# Patient Record
Sex: FEMALE | Race: WHITE
[De-identification: names, ages, dates, MRNs, and addresses within clinical notes are randomized per-mention and may not be internally consistent; named-entity substitution may affect disease eponyms.]

---

## 2017-04-08 ENCOUNTER — HOSPITAL ENCOUNTER (EMERGENCY)
Dept: HOSPITAL 83 - ED | Age: 62
Discharge: HOME | End: 2017-04-08
Payer: COMMERCIAL

## 2017-04-08 VITALS — BODY MASS INDEX: 31.39 KG/M2 | WEIGHT: 200 LBS | HEIGHT: 66.97 IN

## 2017-04-08 DIAGNOSIS — Z88.6: ICD-10-CM

## 2017-04-08 DIAGNOSIS — K57.92: Primary | ICD-10-CM

## 2017-04-08 LAB
ALBUMIN SERPL-MCNC: 3.6 GM/DL (ref 3.1–4.5)
ALBUMIN SERPL-MCNC: NEGATIVE G/DL
ALP SERPL-CCNC: 84 U/L (ref 45–117)
ALT SERPL W P-5'-P-CCNC: 28 U/L (ref 12–78)
APPEARANCE UR: CLEAR
AST SERPL-CCNC: 17 IU/L (ref 3–35)
BACTERIA #/AREA URNS HPF: (no result) /[HPF]
BASOPHILS # BLD AUTO: 0 10*3/UL (ref 0–0.1)
BASOPHILS NFR BLD AUTO: 0.2 % (ref 0–1)
BILIRUB UR QL STRIP: NEGATIVE
BUN SERPL-MCNC: 14 MG/DL (ref 7–24)
CHLORIDE SERPL-SCNC: 108 MMOL/L (ref 98–107)
CO2 SERPL-SCNC: 21 MMOL/L (ref 21–32)
COLOR UR: YELLOW
CRP SERPL-MCNC: 9.27 MG/DL (ref 0–0.3)
EOSINOPHIL # BLD AUTO: 0.2 10*3/UL (ref 0–0.4)
EOSINOPHIL # BLD AUTO: 1.4 % (ref 1–4)
ERYTHROCYTE [DISTWIDTH] IN BLOOD BY AUTOMATED COUNT: 13.3 % (ref 0–14.5)
GLUCOSE SERPL-MCNC: 121 MG/DL (ref 65–99)
GLUCOSE UR QL: NEGATIVE
HCT VFR BLD AUTO: 42.7 % (ref 37–47)
HGB BLD-MCNC: 13.7 G/DL (ref 12–16)
HGB UR QL STRIP: (no result)
IG #: 0.1 10*3/UL (ref 0–0.1)
KETONES UR QL STRIP: NEGATIVE
LEUKOCYTE ESTERASE UR QL STRIP: NEGATIVE
LYMPHOCYTES # BLD AUTO: 2.7 10*3/UL (ref 1.3–4.4)
LYMPHOCYTES NFR BLD AUTO: 20 % (ref 27–41)
MCH RBC QN AUTO: 28.2 PG (ref 27–31)
MCHC RBC AUTO-ENTMCNC: 32.1 G/DL (ref 33–37)
MCV RBC AUTO: 88 FL (ref 81–99)
MONOCYTES # BLD AUTO: 1.1 10*3/UL (ref 0.1–1)
MONOCYTES NFR BLD MANUAL: 8.4 % (ref 3–9)
NEUT #: 9.3 10*3/UL (ref 2.3–7.9)
NEUT %: 69.6 % (ref 47–73)
NITRITE UR QL STRIP: NEGATIVE
NRBC BLD QL AUTO: 0 % (ref 0–0)
PH UR STRIP: 5 [PH] (ref 5–9)
PLATELET # BLD AUTO: 209 10*3/UL (ref 130–400)
PMV BLD AUTO: 10.5 FL (ref 9.6–12.3)
POTASSIUM SERPL-SCNC: 4 MMOL/L (ref 3.5–5.1)
PROT SERPL-MCNC: 8.4 GM/DL (ref 6.4–8.2)
RBC # BLD AUTO: 4.85 10*6/UL (ref 4.1–5.1)
RBC #/AREA URNS HPF: (no result) RBC/HPF (ref 0–2)
SODIUM SERPL-SCNC: 140 MMOL/L (ref 136–145)
SP GR UR: 1.02 (ref 1–1.03)
URINE REFLEX COMMENT: YES
UROBILINOGEN UR STRIP-MCNC: 0.2 E.U./DL (ref 0.2–1)
WBC #/AREA URNS HPF: (no result) WBC/HPF (ref 0–5)
WBC NRBC COR # BLD AUTO: 13.3 10*3/UL (ref 4.8–10.8)

## 2017-04-09 ENCOUNTER — HOSPITAL ENCOUNTER (INPATIENT)
Dept: HOSPITAL 83 - ED | Age: 62
LOS: 1 days | Discharge: HOME | DRG: 392 | End: 2017-04-10
Attending: INTERNAL MEDICINE | Admitting: INTERNAL MEDICINE
Payer: COMMERCIAL

## 2017-04-09 VITALS — DIASTOLIC BLOOD PRESSURE: 65 MMHG | SYSTOLIC BLOOD PRESSURE: 109 MMHG

## 2017-04-09 VITALS — DIASTOLIC BLOOD PRESSURE: 83 MMHG

## 2017-04-09 VITALS — SYSTOLIC BLOOD PRESSURE: 136 MMHG | DIASTOLIC BLOOD PRESSURE: 87 MMHG

## 2017-04-09 VITALS — SYSTOLIC BLOOD PRESSURE: 135 MMHG | DIASTOLIC BLOOD PRESSURE: 85 MMHG

## 2017-04-09 VITALS — DIASTOLIC BLOOD PRESSURE: 99 MMHG | SYSTOLIC BLOOD PRESSURE: 153 MMHG

## 2017-04-09 DIAGNOSIS — K57.92: Primary | ICD-10-CM

## 2017-04-09 DIAGNOSIS — F41.9: ICD-10-CM

## 2017-04-09 DIAGNOSIS — Z79.899: ICD-10-CM

## 2017-04-09 DIAGNOSIS — D72.829: ICD-10-CM

## 2017-04-09 DIAGNOSIS — F33.0: ICD-10-CM

## 2017-04-09 DIAGNOSIS — Z83.79: ICD-10-CM

## 2017-04-09 DIAGNOSIS — Z88.6: ICD-10-CM

## 2017-04-09 DIAGNOSIS — I10: ICD-10-CM

## 2017-04-09 DIAGNOSIS — Z79.2: ICD-10-CM

## 2017-04-09 DIAGNOSIS — Z90.722: ICD-10-CM

## 2017-04-09 LAB
ALBUMIN SERPL-MCNC: 3.8 GM/DL (ref 3.1–4.5)
ALP SERPL-CCNC: 81 U/L (ref 45–117)
ALT SERPL W P-5'-P-CCNC: 25 U/L (ref 12–78)
AST SERPL-CCNC: 13 IU/L (ref 3–35)
BASOPHILS # BLD AUTO: 0 10*3/UL (ref 0–0.1)
BASOPHILS NFR BLD AUTO: 0.2 % (ref 0–1)
BUN SERPL-MCNC: 16 MG/DL (ref 7–24)
CHLORIDE SERPL-SCNC: 107 MMOL/L (ref 98–107)
CO2 SERPL-SCNC: 23 MMOL/L (ref 21–32)
CRP SERPL-MCNC: 10.9 MG/DL (ref 0–0.3)
EOSINOPHIL # BLD AUTO: 0.1 10*3/UL (ref 0–0.4)
EOSINOPHIL # BLD AUTO: 0.9 % (ref 1–4)
ERYTHROCYTE [DISTWIDTH] IN BLOOD BY AUTOMATED COUNT: 13.2 % (ref 0–14.5)
GLUCOSE SERPL-MCNC: 119 MG/DL (ref 65–99)
HCT VFR BLD AUTO: 42.1 % (ref 37–47)
HGB BLD-MCNC: 13.6 G/DL (ref 12–16)
IG #: 0.1 10*3/UL (ref 0–0.1)
LYMPHOCYTES # BLD AUTO: 2.7 10*3/UL (ref 1.3–4.4)
LYMPHOCYTES NFR BLD AUTO: 19.2 % (ref 27–41)
MCH RBC QN AUTO: 28.3 PG (ref 27–31)
MCHC RBC AUTO-ENTMCNC: 32.3 G/DL (ref 33–37)
MCV RBC AUTO: 87.5 FL (ref 81–99)
MONOCYTES # BLD AUTO: 1.4 10*3/UL (ref 0.1–1)
MONOCYTES NFR BLD MANUAL: 9.9 % (ref 3–9)
NEUT #: 9.6 10*3/UL (ref 2.3–7.9)
NEUT %: 69.3 % (ref 47–73)
NRBC BLD QL AUTO: 0 10*3/UL (ref 0–0)
PLATELET # BLD AUTO: 206 10*3/UL (ref 130–400)
PMV BLD AUTO: 10.4 FL (ref 9.6–12.3)
POTASSIUM SERPL-SCNC: 4 MMOL/L (ref 3.5–5.1)
PROT SERPL-MCNC: 7.7 GM/DL (ref 6.4–8.2)
RBC # BLD AUTO: 4.81 10*6/UL (ref 4.1–5.1)
SODIUM SERPL-SCNC: 141 MMOL/L (ref 136–145)
WBC NRBC COR # BLD AUTO: 13.9 10*3/UL (ref 4.8–10.8)

## 2017-04-10 VITALS — DIASTOLIC BLOOD PRESSURE: 62 MMHG | SYSTOLIC BLOOD PRESSURE: 116 MMHG

## 2017-04-10 VITALS — DIASTOLIC BLOOD PRESSURE: 80 MMHG | SYSTOLIC BLOOD PRESSURE: 109 MMHG

## 2017-04-10 VITALS — DIASTOLIC BLOOD PRESSURE: 72 MMHG

## 2017-04-10 VITALS — DIASTOLIC BLOOD PRESSURE: 73 MMHG

## 2017-06-26 ENCOUNTER — HOSPITAL ENCOUNTER (EMERGENCY)
Dept: HOSPITAL 83 - ED | Age: 62
Discharge: HOME | End: 2017-06-26
Payer: COMMERCIAL

## 2017-06-26 VITALS — BODY MASS INDEX: 31.39 KG/M2 | WEIGHT: 200 LBS | HEIGHT: 66.97 IN

## 2017-06-26 DIAGNOSIS — M54.5: ICD-10-CM

## 2017-06-26 DIAGNOSIS — G89.29: Primary | ICD-10-CM

## 2017-06-26 DIAGNOSIS — M19.90: ICD-10-CM

## 2017-06-26 DIAGNOSIS — Z88.6: ICD-10-CM

## 2017-07-13 ENCOUNTER — HOSPITAL ENCOUNTER (OUTPATIENT)
Dept: HOSPITAL 83 - MAMMO | Age: 62
Discharge: HOME | End: 2017-07-13
Attending: OBSTETRICS & GYNECOLOGY
Payer: COMMERCIAL

## 2017-07-13 DIAGNOSIS — Z12.31: Primary | ICD-10-CM

## 2017-09-21 ENCOUNTER — HOSPITAL ENCOUNTER (EMERGENCY)
Dept: HOSPITAL 83 - ED | Age: 62
Discharge: HOME | End: 2017-09-21
Payer: COMMERCIAL

## 2017-09-21 VITALS — BODY MASS INDEX: 29.82 KG/M2 | WEIGHT: 190 LBS | HEIGHT: 66.97 IN

## 2017-09-21 DIAGNOSIS — M19.90: ICD-10-CM

## 2017-09-21 DIAGNOSIS — M79.1: Primary | ICD-10-CM

## 2017-09-21 DIAGNOSIS — Z88.6: ICD-10-CM

## 2017-09-21 DIAGNOSIS — Z79.899: ICD-10-CM

## 2017-10-14 ENCOUNTER — HOSPITAL ENCOUNTER (INPATIENT)
Dept: HOSPITAL 83 - ED | Age: 62
LOS: 2 days | Discharge: HOME | DRG: 392 | End: 2017-10-16
Attending: INTERNAL MEDICINE | Admitting: INTERNAL MEDICINE
Payer: COMMERCIAL

## 2017-10-14 VITALS — DIASTOLIC BLOOD PRESSURE: 73 MMHG | SYSTOLIC BLOOD PRESSURE: 128 MMHG

## 2017-10-14 VITALS — BODY MASS INDEX: 30.53 KG/M2 | WEIGHT: 194.5 LBS | HEIGHT: 66.97 IN

## 2017-10-14 VITALS — DIASTOLIC BLOOD PRESSURE: 56 MMHG | SYSTOLIC BLOOD PRESSURE: 109 MMHG

## 2017-10-14 VITALS — SYSTOLIC BLOOD PRESSURE: 114 MMHG | DIASTOLIC BLOOD PRESSURE: 78 MMHG

## 2017-10-14 VITALS — DIASTOLIC BLOOD PRESSURE: 72 MMHG

## 2017-10-14 DIAGNOSIS — F41.1: ICD-10-CM

## 2017-10-14 DIAGNOSIS — K75.81: ICD-10-CM

## 2017-10-14 DIAGNOSIS — F33.9: ICD-10-CM

## 2017-10-14 DIAGNOSIS — K44.9: ICD-10-CM

## 2017-10-14 DIAGNOSIS — K57.92: Primary | ICD-10-CM

## 2017-10-14 LAB
ALBUMIN SERPL-MCNC: 3.9 GM/DL (ref 3.1–4.5)
ALP SERPL-CCNC: 94 U/L (ref 45–117)
ALT SERPL W P-5'-P-CCNC: 31 U/L (ref 12–78)
APPEARANCE UR: (no result)
AST SERPL-CCNC: 18 IU/L (ref 3–35)
BACTERIA #/AREA URNS HPF: (no result) /[HPF]
BASOPHILS # BLD AUTO: 0 10*3/UL (ref 0–0.1)
BASOPHILS NFR BLD AUTO: 0.2 % (ref 0–1)
BILIRUB UR QL STRIP: NEGATIVE
BUN SERPL-MCNC: 14 MG/DL (ref 7–24)
CHLORIDE SERPL-SCNC: 107 MMOL/L (ref 98–107)
COLOR UR: YELLOW
CREAT SERPL-MCNC: 0.88 MG/DL (ref 0.55–1.02)
EOSINOPHIL # BLD AUTO: 0.1 10*3/UL (ref 0–0.4)
EOSINOPHIL # BLD AUTO: 0.9 % (ref 1–4)
EPI CELLS #/AREA URNS HPF: (no result) /[HPF]
ERYTHROCYTE [DISTWIDTH] IN BLOOD BY AUTOMATED COUNT: 16 % (ref 0–14.5)
GLUCOSE UR QL: NEGATIVE
HCT VFR BLD AUTO: 37.6 % (ref 37–47)
HGB BLD-MCNC: 11.8 G/DL (ref 12–16)
HGB UR QL STRIP: NEGATIVE
KETONES UR QL STRIP: (no result)
LEUKOCYTE ESTERASE UR QL STRIP: (no result)
LYMPHOCYTES # BLD AUTO: 1.8 10*3/UL (ref 1.3–4.4)
LYMPHOCYTES NFR BLD AUTO: 15.7 % (ref 27–41)
MCH RBC QN AUTO: 25.8 PG (ref 27–31)
MCHC RBC AUTO-ENTMCNC: 31.4 G/DL (ref 33–37)
MCV RBC AUTO: 82.3 FL (ref 81–99)
MONOCYTES # BLD AUTO: 1.1 10*3/UL (ref 0.1–1)
MONOCYTES NFR BLD MANUAL: 9 % (ref 3–9)
NEUT #: 8.7 10*3/UL (ref 2.3–7.9)
NEUT %: 73.9 % (ref 47–73)
NITRITE UR QL STRIP: NEGATIVE
NRBC BLD QL AUTO: 0 10*3/UL (ref 0–0)
PH UR STRIP: 5.5 [PH] (ref 5–9)
PLATELET # BLD AUTO: 192 10*3/UL (ref 130–400)
PMV BLD AUTO: 11.1 FL (ref 9.6–12.3)
POTASSIUM SERPL-SCNC: 4.5 MMOL/L (ref 3.5–5.1)
PROT SERPL-MCNC: 7.8 GM/DL (ref 6.4–8.2)
RBC # BLD AUTO: 4.57 10*6/UL (ref 4.1–5.1)
RBC #/AREA URNS HPF: (no result) RBC/HPF (ref 0–2)
SODIUM SERPL-SCNC: 140 MMOL/L (ref 136–145)
SP GR UR: 1.02 (ref 1–1.03)
UROBILINOGEN UR STRIP-MCNC: 0.2 E.U./DL (ref 0.2–1)
WBC #/AREA URNS HPF: (no result) WBC/HPF (ref 0–5)
WBC NRBC COR # BLD AUTO: 11.7 10*3/UL (ref 4.8–10.8)

## 2017-10-14 NOTE — NUR
RESTING QUIETLY IN BED WITH EYES CLOSED. C/O ABDOMINAL PAIN OF 6, REPORTS THIS
IS IMPROVED. NO FURTHER COMPLAINTS. NO SIGN OF DISTRESS. IVF COMPLETE.

## 2017-10-14 NOTE — NUR
PATIENT LYING IN BED, NO S/S OF DISTRESS. NO EDEMA NOTED. BOWEL SOUNDS
NORMOACTIVE, NO COUGH. PATIENT STATED THAT SHE HAD BM TODAY. IV INTACT IN LEFT
HAND, IV FLUID RUNNING WITHOUT PROBLEMS.

## 2017-10-14 NOTE — NUR
Time: 1800
A 62  year old FEMALE admitted to 5E
under services of MARIALUISA CHAUDHARY MD.
Pt. arrived via stretcher from
ER.  Chief complaint: ABDOMINAL PAIN.
 
BREEZY BHAGAT

## 2017-10-14 NOTE — WRIGHTHP
[place], [place]
 
                               PATIENT HISTORY AND PHYSICAL EXAM
 
        NAME: ILSA GRUBER               [facility] #: [id number]  
        UNIT #: [id number]                        ROOM: 504       
        DOCTOR: MARIALUISA MORALES MD                BIRTHDATE: [birth year]
 
 
DOS: 10/14/2017
 
HISTORY OF PRESENT ILLNESS:  The patient is 62 years old.  The patient is not
known to me.  The patient of  _____.  She was brought in with complaints of
abdominal pain.  The patient states that she was admitted to the hospital
earlier this year with acute diverticulitis.  She was not very careful and was
eating protein bar which had nuts in it, developed severe abdominal pain
yesterday, so came in to the Emergency Room for evaluation, was diagnosed with
acute diverticulitis and was admitted.  The patient denies having any chest
pains, palpitations, does not have any fever or chills, does not have any nausea
or any emesis.  States that she is feeling better already.
 
PAST MEDICAL HISTORY:  Significant for;
1.  Generalized anxiety disorder.
2.  Mild major depression, recurrent.
3.  Diverticulosis with multiple admissions for diverticulitis.
 
MEDICATIONS:  Lipitor 5 mg daily, vitamin D 5000 daily, diazepam 10 t.i.d.,
Prozac 20 b.i.d., gabapentin 100 b.i.d., levothyroxine 50 mcg, meloxicam 15
daily.
 
SOCIAL HISTORY:  Nonsmoker.
 
PHYSICAL EXAMINATION:
GENERAL:  She is awake and alert and oriented.  No major distress.
VITAL SIGNS:  Graphic trend shows a pressure of 80/60, pulse of 73, respirations
18, temperature 97.6.
LUNGS:  Clear.
HEART:  Regular.
ABDOMEN:  Obese, soft, nontender.
EXTREMITIES:  Without any edema.
 
ASSESSMENT AND PLAN:
1.  Acute diverticulitis without any surrounding inflammation.  The patient to
have a GI consultation with Dr. Zee.  IV antibiotics have been started.  We
will require a colonoscopy.
2.  Hiatal hernia with fatty liver, nonalcoholic steatohepatitis noted.  Advised
weight loss and continuation of the cholesterol-lowering medications.
 
 
 
 
                              [place], [place]
 
                               PATIENT HISTORY AND PHYSICAL EXAM
 
        [facility]: ILSA GRUBER               [id number]  
        UNIT #: [id number]                        ROOM: 504       
        DOCTOR: MARIALUISA MORALES MD                BIRTHDATE: [birth year]
 
 
_________________________________
MARIALUISA MORALES MD
 
CM:MARGAS:PATIENT HISTORY AND PHYSICAL EXAMINATION
 
D: 10/15/17 0811
T: 10/15/17 0909
    
                                                            
MARIALUISA MORALES MD               
                                                            
10/15/17
0909
                              
interface

## 2017-10-14 NOTE — DS
[place], [place]
 
                                    DISCHARGE SUMMARY
 
        NAME: ILSA GRUBER               [facility] #: [id number]  
        UNIT #: [id number]                        ROOM: 504       
        DOCTOR: MARIALUISA MORALES MD                BIRTHDATE: [birth year]
 
 
DOS: 10/16/2017
 
DIAGNOSES:
1.  Acute diverticulitis, noncomplicated.
2.  Generalized anxiety disorder.
3.  Mild major depression, recurrent.
4.  History of colonoscopy 3 months ago.
5.  Nonalcoholic steatohepatitis with hiatal hernia.
 
MEDICATIONS ON DISCHARGE:  Flagyl 500 mg 3 times a day for 7 days, Cipro 500 mg
twice a day for 5 days and then home medications are being continued.
 
HOSPITAL COURSE:  A 62-year-old presents with abdominal pain.  Please see H and
P for details.  She was diagnosed with acute diverticulitis and was admitted.  A
CT of the abdomen showed no evidence of any abscess formation.  Uncomplicated
diverticulitis was seen on a CT.  IV antibiotics were given and the patient is
doing much better, does not have any new complaints and the plan therefore is to
discharge her to home today on continued antibiotics for 1 more week and follow
up with her PCP after the completion of antibiotics.  She is aware of the fact
that she should avoid nuts, corn, popcorn and seeds.
 
 
 
_________________________________
MARIALUISA MORALES MD
 
CM:KALPESH
 
D: 10/16/17 0834
T: 10/16/17 0944
    
                                                            
MARIALUISA MORALES MD               
                                                            
10/16/17
0943
                              
interface

## 2017-10-14 NOTE — PR
[place], [place]
 
                                      PROGRESS NOTE
 
        NAME: ILSA GRUBER              [facility] #: [id number]  
        UNIT #: [id number]                       ROOM: 504       
        DOCTOR: MARIALUISA MORALES MD               BIRTHDATE: [birth year]
 
 
DOS: 
 
SUBJECTIVE:  The patient is doing fine without any complaints.
 
OBJECTIVE:
GENERAL:  The patient is awake and alert and oriented.
VITAL SIGNS:  Blood pressure is 114/74, pulse of 67, respirations 18,
temperature 97.6.
LUNGS:  Clear.
HEART:  Regular.
ABDOMEN:  Obese, soft, nontender this morning.
EXTREMITIES:  Without any edema.
 
ASSESSMENT AND PLAN:
1.  Acute noncomplicated diverticulitis.  The patient is clinically much
improved.  The plan is to discharge her to home today on p.o. antibiotics.  She
did have a colonoscopy 3 months ago, which was unremarkable, so she can follow
up with the GI doctor.
2.  Urine culture was negative.  Urinary tract infection has been ruled out.
3.  Generalized anxiety disorder, controlled.
 
 
 
_________________________________
MARIALUISA MORALES MD
 
CM:PNTRANS
 
D: 10/16/17 0832                 
T: 10/16/17 1018
             
                                                            
MARIALUISA MORALES MD               
                                                            
10/16/17
1016
                              
interface

## 2017-10-15 VITALS — DIASTOLIC BLOOD PRESSURE: 55 MMHG

## 2017-10-15 VITALS — DIASTOLIC BLOOD PRESSURE: 78 MMHG | SYSTOLIC BLOOD PRESSURE: 80 MMHG

## 2017-10-15 VITALS — SYSTOLIC BLOOD PRESSURE: 99 MMHG | DIASTOLIC BLOOD PRESSURE: 68 MMHG

## 2017-10-15 NOTE — NUR
PATIENT UP TO BATHROOM SEVERAL TIMES DURING THE NIGHT, AMBULATORY WITHOUT
PROBLEMS. IV INTACT, NO PROBLEMS NOTED.

## 2017-10-16 VITALS — DIASTOLIC BLOOD PRESSURE: 70 MMHG | SYSTOLIC BLOOD PRESSURE: 126 MMHG

## 2017-10-16 VITALS — DIASTOLIC BLOOD PRESSURE: 59 MMHG | SYSTOLIC BLOOD PRESSURE: 97 MMHG

## 2017-10-16 VITALS — DIASTOLIC BLOOD PRESSURE: 74 MMHG | SYSTOLIC BLOOD PRESSURE: 114 MMHG

## 2017-10-16 NOTE — NUR
Patient resting quietly with no c/o discomfort. Respirations easy and regular.
Vital signs stable. No overt distress.
JESUS BROWNE

## 2017-10-16 NOTE — NUR
Discharge instructions reviewed with patient/family. Patient receptive and
verbalizes understanding. Follow-up care arranged. Written instructions given
to patient/family.HEPLOCK REMOVED.
JESUS BROWNE

## 2018-04-15 ENCOUNTER — HOSPITAL ENCOUNTER (EMERGENCY)
Dept: HOSPITAL 83 - ED | Age: 63
Discharge: HOME | End: 2018-04-15
Payer: MEDICARE

## 2018-04-15 VITALS — WEIGHT: 180 LBS | BODY MASS INDEX: 28.25 KG/M2 | HEIGHT: 66.97 IN
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
## 2018-04-15 DIAGNOSIS — S92.251A: Primary | ICD-10-CM

## 2018-04-15 DIAGNOSIS — Z88.5: ICD-10-CM

## 2018-04-15 DIAGNOSIS — M54.5: ICD-10-CM

## 2018-04-15 DIAGNOSIS — M19.90: ICD-10-CM
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
## 2018-04-15 DIAGNOSIS — Z79.899: ICD-10-CM

## 2018-04-15 DIAGNOSIS — G89.29: ICD-10-CM

## 2018-04-15 DIAGNOSIS — Z98.890: ICD-10-CM
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
## 2018-06-12 ENCOUNTER — HOSPITAL ENCOUNTER (EMERGENCY)
Dept: HOSPITAL 83 - ED | Age: 63
Discharge: HOME | End: 2018-06-12
Payer: MEDICARE

## 2018-06-12 VITALS — BODY MASS INDEX: 26.84 KG/M2 | WEIGHT: 171 LBS | HEIGHT: 66.97 IN

## 2018-06-12 DIAGNOSIS — Z79.899: ICD-10-CM

## 2018-06-12 DIAGNOSIS — Z88.5: ICD-10-CM

## 2018-06-12 DIAGNOSIS — Z88.1: ICD-10-CM

## 2018-06-12 DIAGNOSIS — N39.0: Primary | ICD-10-CM

## 2018-06-12 DIAGNOSIS — M19.90: ICD-10-CM

## 2018-06-12 DIAGNOSIS — G89.29: ICD-10-CM

## 2018-06-12 LAB
APPEARANCE UR: CLEAR
BILIRUB UR QL STRIP: NEGATIVE
COLOR UR: YELLOW
EPI CELLS #/AREA URNS HPF: (no result) /[HPF]
GLUCOSE UR QL: NEGATIVE
HGB UR QL STRIP: NEGATIVE
KETONES UR QL STRIP: NEGATIVE
LEUKOCYTE ESTERASE UR QL STRIP: (no result)
NITRITE UR QL STRIP: NEGATIVE
PH UR STRIP: 5.5 [PH] (ref 5–9)
RBC #/AREA URNS HPF: (no result) RBC/HPF (ref 0–2)
SP GR UR: >= 1.03 (ref 1–1.03)
UROBILINOGEN UR STRIP-MCNC: 0.2 E.U./DL (ref 0.2–1)
WBC #/AREA URNS HPF: (no result) WBC/HPF (ref 0–5)

## 2019-02-28 ENCOUNTER — HOSPITAL ENCOUNTER (EMERGENCY)
Dept: HOSPITAL 83 - ED | Age: 64
Discharge: HOME | End: 2019-02-28
Payer: MEDICARE

## 2019-02-28 VITALS — BODY MASS INDEX: 25.11 KG/M2 | WEIGHT: 160 LBS | HEIGHT: 66.97 IN

## 2019-02-28 DIAGNOSIS — Z88.1: ICD-10-CM

## 2019-02-28 DIAGNOSIS — Z90.89: ICD-10-CM
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
## 2019-02-28 DIAGNOSIS — S50.11XA: Primary | ICD-10-CM

## 2019-02-28 DIAGNOSIS — S60.211A: ICD-10-CM

## 2019-02-28 DIAGNOSIS — Z88.5: ICD-10-CM
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
## 2019-02-28 DIAGNOSIS — Z46.89: ICD-10-CM

## 2019-02-28 DIAGNOSIS — Z79.899: ICD-10-CM
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
## 2019-03-09 ENCOUNTER — HOSPITAL ENCOUNTER (EMERGENCY)
Dept: HOSPITAL 83 - ED | Age: 64
Discharge: HOME | End: 2019-03-09
Payer: MEDICARE

## 2019-03-09 VITALS — BODY MASS INDEX: 26.68 KG/M2 | HEIGHT: 66.97 IN | WEIGHT: 170 LBS

## 2019-03-09 DIAGNOSIS — Z88.1: ICD-10-CM

## 2019-03-09 DIAGNOSIS — Z79.899: ICD-10-CM

## 2019-03-09 DIAGNOSIS — Z98.890: ICD-10-CM

## 2019-03-09 DIAGNOSIS — Z88.6: ICD-10-CM

## 2019-03-09 DIAGNOSIS — Z48.01: ICD-10-CM

## 2019-03-09 DIAGNOSIS — M79.601: Primary | ICD-10-CM

## 2020-02-25 ENCOUNTER — HOSPITAL ENCOUNTER (EMERGENCY)
Dept: HOSPITAL 83 - ED | Age: 65
LOS: 1 days | Discharge: HOME HEALTH | End: 2020-02-26
Payer: COMMERCIAL

## 2020-02-25 VITALS — WEIGHT: 170 LBS | HEIGHT: 65.98 IN | BODY MASS INDEX: 27.32 KG/M2

## 2020-02-25 DIAGNOSIS — F22: ICD-10-CM

## 2020-02-25 DIAGNOSIS — M19.90: ICD-10-CM

## 2020-02-25 DIAGNOSIS — E78.5: ICD-10-CM

## 2020-02-25 DIAGNOSIS — Z88.5: ICD-10-CM

## 2020-02-25 DIAGNOSIS — Z79.899: ICD-10-CM

## 2020-02-25 DIAGNOSIS — G89.29: ICD-10-CM

## 2020-02-25 DIAGNOSIS — E03.9: ICD-10-CM

## 2020-02-25 DIAGNOSIS — Z88.1: ICD-10-CM

## 2020-02-25 DIAGNOSIS — F31.0: Primary | ICD-10-CM

## 2020-02-25 LAB
AMPHETAMINES UR QL SCN: < 1000
APAP SERPL-MCNC: < 5 UG/ML (ref 10–30)
APPEARANCE UR: CLEAR
BACTERIA #/AREA URNS HPF: (no result) /[HPF]
BARBITURATES UR QL SCN: < 200
BASOPHILS # BLD AUTO: 0 10*3/UL (ref 0–0.1)
BASOPHILS NFR BLD AUTO: 0.2 % (ref 0–1)
BENZODIAZ UR QL SCN: > 200
BILIRUB UR QL STRIP: NEGATIVE
BUN SERPL-MCNC: 13 MG/DL (ref 7–24)
BZE UR QL SCN: < 300
CANNABINOIDS UR QL SCN: < 50
CHLORIDE SERPL-SCNC: 108 MMOL/L (ref 98–107)
COLOR UR: YELLOW
CREAT SERPL-MCNC: 0.92 MG/DL (ref 0.55–1.02)
EOSINOPHIL # BLD AUTO: 0 10*3/UL (ref 0–0.4)
EOSINOPHIL # BLD AUTO: 0.3 % (ref 1–4)
ERYTHROCYTE [DISTWIDTH] IN BLOOD BY AUTOMATED COUNT: 16.5 % (ref 0–14.5)
ETHANOL SERPL-MCNC: < 3 MG/DL (ref ?–3)
GLUCOSE UR QL: NEGATIVE
HCT VFR BLD AUTO: 42.7 % (ref 37–47)
HGB BLD-MCNC: 13.2 G/DL (ref 12–16)
HGB UR QL STRIP: (no result)
KETONES UR QL STRIP: (no result)
LEUKOCYTE ESTERASE UR QL STRIP: (no result)
LYMPHOCYTES # BLD AUTO: 2 10*3/UL (ref 1.3–4.4)
LYMPHOCYTES NFR BLD AUTO: 22 % (ref 27–41)
MCH RBC QN AUTO: 26.6 PG (ref 27–31)
MCHC RBC AUTO-ENTMCNC: 30.9 G/DL (ref 33–37)
MCV RBC AUTO: 85.9 FL (ref 81–99)
METHADONE UR QL SCN: < 300
MONOCYTES # BLD AUTO: 0.8 10*3/UL (ref 0.1–1)
MONOCYTES NFR BLD MANUAL: 8.9 % (ref 3–9)
NEUT #: 6.2 10*3/UL (ref 2.3–7.9)
NEUT %: 68.4 % (ref 47–73)
NITRITE UR QL STRIP: NEGATIVE
NRBC BLD QL AUTO: 0 % (ref 0–0)
OPIATES UR QL SCN: < 300
PCP UR QL SCN: <  25
PH UR STRIP: 6 [PH] (ref 5–9)
PLATELET # BLD AUTO: 207 10*3/UL (ref 130–400)
PMV BLD AUTO: 11 FL (ref 9.6–12.3)
POTASSIUM SERPL-SCNC: 3.5 MMOL/L (ref 3.5–5.1)
RBC # BLD AUTO: 4.97 10*6/UL (ref 4.1–5.1)
SODIUM SERPL-SCNC: 141 MMOL/L (ref 136–145)
SP GR UR: 1.02 (ref 1–1.03)
UROBILINOGEN UR STRIP-MCNC: 0.2 E.U./DL (ref 0.2–1)
WBC #/AREA URNS HPF: (no result) WBC/HPF (ref 0–5)
WBC NRBC COR # BLD AUTO: 9 10*3/UL (ref 4.8–10.8)

## 2020-02-26 ENCOUNTER — HOSPITAL ENCOUNTER (INPATIENT)
Dept: HOSPITAL 83 - 3N | Age: 65
LOS: 16 days | DRG: 885 | End: 2020-03-13
Attending: PSYCHIATRY & NEUROLOGY | Admitting: PSYCHIATRY & NEUROLOGY
Payer: MEDICARE

## 2020-02-26 VITALS — SYSTOLIC BLOOD PRESSURE: 112 MMHG | DIASTOLIC BLOOD PRESSURE: 68 MMHG

## 2020-02-26 VITALS — SYSTOLIC BLOOD PRESSURE: 115 MMHG | DIASTOLIC BLOOD PRESSURE: 78 MMHG

## 2020-02-26 VITALS — BODY MASS INDEX: 23.23 KG/M2 | WEIGHT: 148 LBS | HEIGHT: 67 IN

## 2020-02-26 VITALS — DIASTOLIC BLOOD PRESSURE: 79 MMHG

## 2020-02-26 VITALS — DIASTOLIC BLOOD PRESSURE: 79 MMHG | SYSTOLIC BLOOD PRESSURE: 138 MMHG

## 2020-02-26 DIAGNOSIS — M54.5: ICD-10-CM

## 2020-02-26 DIAGNOSIS — M79.18: ICD-10-CM

## 2020-02-26 DIAGNOSIS — F13.20: ICD-10-CM

## 2020-02-26 DIAGNOSIS — G30.9: ICD-10-CM

## 2020-02-26 DIAGNOSIS — M19.90: ICD-10-CM

## 2020-02-26 DIAGNOSIS — R41.9: ICD-10-CM

## 2020-02-26 DIAGNOSIS — Z79.899: ICD-10-CM

## 2020-02-26 DIAGNOSIS — E78.5: ICD-10-CM

## 2020-02-26 DIAGNOSIS — R73.9: ICD-10-CM

## 2020-02-26 DIAGNOSIS — G89.29: ICD-10-CM

## 2020-02-26 DIAGNOSIS — E03.9: ICD-10-CM

## 2020-02-26 DIAGNOSIS — Z88.1: ICD-10-CM

## 2020-02-26 DIAGNOSIS — N80.9: ICD-10-CM

## 2020-02-26 DIAGNOSIS — Z90.722: ICD-10-CM

## 2020-02-26 DIAGNOSIS — F33.3: Primary | ICD-10-CM

## 2020-02-26 DIAGNOSIS — F02.80: ICD-10-CM

## 2020-02-26 DIAGNOSIS — F41.9: ICD-10-CM

## 2020-02-26 DIAGNOSIS — Z88.5: ICD-10-CM

## 2020-02-26 DIAGNOSIS — E87.8: ICD-10-CM

## 2020-02-26 DIAGNOSIS — Z83.3: ICD-10-CM

## 2020-02-26 DIAGNOSIS — E55.9: ICD-10-CM

## 2020-02-26 NOTE — NUR
POOR SLEEP. WANTS TO GO HOME.  DOESN'T WANT TO TAKE DEPAKOTE WANTS HER VALIUM.
REDIRECTED TO TIME AND REINFORCED DR ELMORE WOULD BE HERE IN MORNING. EMOTIONAL
SUPPORT PROVIDED

## 2020-02-26 NOTE — NUR
ILSA GRUBER a 65 year old F admitted via
wheel chair from the EMERGENCY ROOM as a emergency 72 hr. hold admission.
Arrived on unit at 0025AM.
ALLERGIES: CODIENE AND CIPRO.
Vital signs are: 97.9-87-16 138/79.
CLIENT IS PINK SLIPPED SO UNABLE TO SIGN VOLUNTARY CONSENT
The client signed the following forms with stated understanding: Authorization
For The Release of Medical Information,
, Consent and Release Forms/Receipt of Rights,
Acknowledgement of Advance Directive Information, Behavioral Health Consent
Form, and Informed Consent of Medications. [facility] A search was conducted and hazardous articles were removed.
Client was oriented to the unit.
ANGEL PAEZ

## 2020-02-26 NOTE — NUR
AM GROUP
COMPLETED PT ASSESSMENT IN PT ROOM AS PT LAY IN BED. PT EXPRESSED DELUSIONAL
THINKING DURING THE INTERVIEW. PT CHOSE NOT TO ATTEND MORNING GROUP BUT STAYED
IN BED.

## 2020-02-27 VITALS — SYSTOLIC BLOOD PRESSURE: 118 MMHG | DIASTOLIC BLOOD PRESSURE: 57 MMHG

## 2020-02-27 VITALS — SYSTOLIC BLOOD PRESSURE: 119 MMHG | DIASTOLIC BLOOD PRESSURE: 72 MMHG

## 2020-02-27 NOTE — NUR
TREATMENT PLAN MEETING WAS HELD WITH DR. ELMORE, RN, AT, LIS-S AND DISCHARGE
PLANNER. PLAN FOR DISCHARGE MONDAY WITH RETURN HOME.

## 2020-02-27 NOTE — NUR
P-DEPRESSED MOOD, MEDICATION NONCOMPLIANCE
I-PROVIDED 1:1 WITH THERAPEUTIC INTERVENTIONS. PROVIDED SUPPORT. ENCOURAGE
MEDICATION COMPLIANCE AND EDUCATED. MONITOR SLEEP.
R-PT ALERT AND ORIENTED X4. PT ISOLATIVE TO ROOM AND BED SINCE BEGINNING OF
SHIFT, CALM, REFUSED HS SNACK.  PT GUARDED DURING 1:1, STATING "IM NOT TAKING
ANY MEDICATIONS TONIGHT, I WANT A DIFFERENT DOCTOR AND IM NOT WANTING TO TALK
ABOUT IT", SUPPORT OFFERED BUT REFUSED. PT REFUSED ALL HS MEDICATIONS AND
EDUCATION. DENIES SI/HI, HALLUCINATIONS, OR PAIN. PT CONTRACTED FOR SAFETY. PT
INDEPENDENT IN ADL'S, CONTINENT OF BOWEL AND BLADDER. PT CURRENTLY RESTING
WITH EYES CLOSED, RESPIRATIONS EASY AND REGULAR, NO SIGNS OR SYMPTOMS OF
DISTRESS NOTED.
P-CONTINUE TO MONITOR MOOD AND BEHAVIORS. MAINTAIN Q 15 MIN CHECKS.

## 2020-02-27 NOTE — NUR
P-DEPRESSED MOOD
I-PROVIDED 1:1 WITH THERAPEUTIC INTERVENTIONS. PROVIDED SUPPORT. ENCOURAGE
MEDICATION COMPLIANCE AND EDUCATED. MONITOR SLEEP.
R-PT ALERT AND ORIENTED X4. PT ISOLATIVE TO ROOM AND BED SINCE BEGINNING OF
SHIFT, CALM. PT GUARDED DURING 1:1, STATING TO THIS RN THAT SHE HAS BEEN
DEPRESSED AND ANXIOUS FOR MANY REASONS OVER THE YEARS AND TO "READ UP ON HER
CHART". PT ALSO STATED THAT SHE IS ALSO CURRENTLY GRIEVING BECAUSE SHE IS
MISSING A FAMILY MEMBERS  THAT IS TAKING PLACE 20. SUPPORT
PROVIDED. PT DENIES SI/HI, HALLUCINATIONS, OR PAIN. PT CONTRACTED FOR SAFETY.
PT MEDICATION COMPLIANT WITHOUT DIFFICULTY AFTER REVIEW. PT INDEPENDENT IN
ADL'S, CONTINENT OF BOWEL AND BLADDER. PT CURRENTLY RESTING WITH EYES CLOSED,
RESPIRATIONS EASY AND REGULAR, NO SIGNS OR SYMPTOMS OF DISTRESS NOTED.
P-CONTINUE TO MONITOR MOOD AND BEHAVIORS. MAINTAIN Q 15 MIN CHECKS.

## 2020-02-27 NOTE — NUR
PATIENT OBSERVED ON Q 15 MIN CHECKS TO HAVE SLEPT APPROX 7 HOURS
UNINTERRUPTED. NO SIGNS OR SYMPTOMS OF DISTRESS NOTED.

## 2020-02-27 NOTE — NUR
Spoke with Kathrynmarleny Daniel of [facility] who
states that she has received calls voicing concern about pt. Calls have
been made by the police and neighbors. According to reports, pt has been
contacting neighbors at different times throughout the day complaining about
the neighbors' behaviors. One example is that pt called neighbor complaining
that they were playing their Lilly music too loud, but the neighbor's
house was actually empty because the neighbors were out of state at the time.
Kathryn has attempted to meet with pt in pt's home. When Kathryn stopped one
time, pt's  let Kathryn in but pt refused to come out of her bedroom to
speak to Kathryn. Kathryn was at pt's home today and met with pt's . He
informed Kathryn that pt is at [facility]. Because it has been difficult for Kathryn
to meet with pt in the home, Kathryn will be at [facility] tomorrow to meet with pt.
Kathryn did report that pt's  appeared to being doing well.

## 2020-02-28 VITALS — DIASTOLIC BLOOD PRESSURE: 61 MMHG | SYSTOLIC BLOOD PRESSURE: 122 MMHG

## 2020-02-28 VITALS — DIASTOLIC BLOOD PRESSURE: 80 MMHG

## 2020-02-28 NOTE — NUR
DOCTOR VILLALPANDO UPDATED WITH PT REFUSING MEALS ,FLUIDS,VITALS NEW ORDER TO KEEP
ENCOURAGING FLUIDS AND TO ATTEMPT VITALS EVERY SHIFT

## 2020-02-28 NOTE — NUR
PATIENT OBSERVED ON Q 15 MIN SAFETY CHECKS TO HAVE SLEPT APPROX 8 HOURS
THROUGHOUT THE NIGHT WITH NO AWAKENINGS OR SIGNS AND SYMPTOMS OF DISTRESS
NOTED.

## 2020-02-28 NOTE — NUR
TREATMENT PLAN MEETING WAS HELD WITH MISSY RUBIN, RN, AT, LISW-S AND DISCHARGE
PLANNER. PLAN FOR DISCHARGE NEXT WEEK, POSSIBLE WEDNESDAY WITH RETURN HOME.
APS WORKER GIULIA SONG TO SEE PATIENT TODAY FOR FOLLOW UP.

## 2020-02-28 NOTE — NUR
P-NONCOMPLIANT WITH CARE -PT REFUSE'S HOC,MEDS,MEALS ,FLUIDS,VITALS, GROUP
ATTENDANCE /PARTICIPATION, STAFF INTERACTION AND INTERACTIOON WITH OTHER PEERS
ISOLATED TO ROOM
I-1;1attempted,encourage medication compliance, encouraged to eat meals,
drink fluids, attend group participate and interact with staff and peers
r- pt still refusing hoc, medication's, meals, fluids,tells staff no WHEN
OFFERED FLUIDS OR SNACKS ALOMG WITH MEDICATIONS
P-ENCOURAGE 1;1, ENCOURAGE MEDICATION COMPLIANCE,OFFER MEALS WITH FLUIDS
,ENCOURAGE TO ATTEND AND PARTICIPATE IN GROUP ,INTERACT WITH STAFF AND PEERS
 
 
SEE [facility] [[email or web address]] FLOWSHEET FOE SPECIFIC MONITORING

## 2020-02-28 NOTE — NUR
GIULIA SONG IN TO SEE PATIENT FROM [facility] IN REGARDS TO
CALLS RECEIVED TO APS. PT. REFUSED TO SPEAK WITH GIULIA AND ASKED HER TO
LEAVE. NURSE PRESENT DURING INTERVIEW. SPOKE WITH CARYN LOUIS AND NOTIFIED HER
THAT APS HAS REQUESTED A COMPETENCY EVALUATION DUE TO BEHAVIORS AND NEED TO
ESTABLISH SAFETY AT HOME. PT.  REPORTS THAT HE IS UNABLE TO CARE FOR
PATIENT DUE TO HIS OWN HEALTH CARE NEEDS.

## 2020-02-29 VITALS — DIASTOLIC BLOOD PRESSURE: 61 MMHG

## 2020-02-29 VITALS — DIASTOLIC BLOOD PRESSURE: 71 MMHG

## 2020-02-29 NOTE — NUR
PATIENT SLEPT 8 HOURS UNINTERRUPTED SLEEP THROUGHOUT SHIFT. Q 15 MINUTE CHECKS
MAINTAINED. 24 HR chart check completed.

## 2020-02-29 NOTE — NUR
P-ISOLATIVE / WITHDRAWN TO ROOM
I- 1;1 ENCOURAGE MEDICATION COMPLIANCE , ATTEND GROUP AND PARTICIPATE Q 15 MIN
CHECKS
R- 1;1 ENCOURAGE GROUP ATTENDENCE STILL REFUSING MEDICATION
P-1;1 ENCOUARGE MEDICATION COMPLIANCE ATTEND GROUP AND PARTICPATE Q 15 MIN
CHECKS
 
 
SEE [facility] NOTES FOR BHAVIOR MONITORING

## 2020-02-29 NOTE — NUR
P-CONFUSION, ISOLATIVE
 
I-REDIRECTION WITH THERAPEUTIC INTERVENTIONS AND PRESENT REALITY. EDUCATE AND
ENCOURAGE MEDICATION COMPLIANCE
 
R-PATIENT REFUSED ALL MEDICATION AT HS. PATIENT STATING "I CAN'T TAKE THOSE
PILLS, I'M IN WITNSS PROTECT PROGRAM". PATIENT PROVIDED FLUIDS AND NOURISHMENT
AT HS.  PATIENT WITH NO SUICIDAL OR HOMICIDAL IDEATIONS.
 
P-CONTINUE TO ENCOURAGE MEDICATION COMPLIANCE, CONTINUE TO ENCOURAGE REALITY,
ENCOURAGE GROUP THEAPY WHILE AWAKE

## 2020-03-01 VITALS — DIASTOLIC BLOOD PRESSURE: 64 MMHG | SYSTOLIC BLOOD PRESSURE: 108 MMHG

## 2020-03-01 VITALS — DIASTOLIC BLOOD PRESSURE: 69 MMHG | SYSTOLIC BLOOD PRESSURE: 110 MMHG

## 2020-03-01 NOTE — NUR
PATIENT SLEPT 8 HOURS UNINTERRUPTED THROUGHOUT SHIFT. Q 15 MINUTE CHECKS
MAINTAINED. 24 HR chart check completed. NO CHANGE IN SKIN INTEGRITY ON
ASSESSMENT THIS SHIFT

## 2020-03-01 NOTE — NUR
P-CONFUSION, ISOLATIVE
 
I-REDIRECTION WITH THERAPEUTIC INTERVENTIONS AND PRESENT REALITY. EDUCATE AND
ENCOURAGE MEDICATION COMPLIANCE
 
R-PATIENT REFUSED ALL MEDICATION AT HS. PATIENT ISOLATIVE TO ROOM AND WITH
LIMITED INTERACTION WITH PEERS. PATIENT PROVIDED FLUIDS AND NOURISHMENT AT HS.
PATIENT WITH NO SUICIDAL OR HOMICIDAL IDEATIONS. PATIENT WITH NO
HALLUCINATIONS.
 
P-CONTINUE TO ENCOURAGE MEDICATION COMPLIANCE, CONTINUE TO ENCOURAGE REALITY,
ENCOURAGE GROUP THEAPY WHILE AWAKE

## 2020-03-01 NOTE — NUR
p-isolated/withdrawn
I-1;1 ENCOURAGE GROUP ATTENDENCE /PARTICIPATATION Q 15 MIN SAFETY CHECKS
ENCOURAGE MEDICATION COMPLIANCE
R- 1;1 INEFFECTIVE , REFUSEES GROUP SLEEPS ALOT REFUSE MEDICATION
P-1;1 ENCOURAGE GROUP ATTEDENCE AND PARTICIPATION  Q 15 MIN CHECKS ENCOURAGE
MED COMPLIANCE
 
 
SEE [facility]  NOTES FOR BEHAVIOR MONITORING

## 2020-03-02 VITALS — DIASTOLIC BLOOD PRESSURE: 64 MMHG | SYSTOLIC BLOOD PRESSURE: 109 MMHG

## 2020-03-02 VITALS — DIASTOLIC BLOOD PRESSURE: 65 MMHG

## 2020-03-02 NOTE — NUR
Spoke to Kathryn Daniel of [facility] and
discussed pt's current status.

## 2020-03-02 NOTE — NUR
P-ISOLATIVE, DEPRESSED
I-1:1 VERBAL INTERVENTION, ADMINISTER MEDS, MONITOR SLEEP
R-PT HAS REMAINED ISOLATIVE TO HER ROOM. AMBULATES INDEPENDENTLY TO DINING
ROOM FOR SNACK. ALERT & ORIENTED X 4. LIMITED VERBALLY. DOES STATE THAT SHE
FEELS DEPRESSED & ANXIOUS. COMPLAIANT WITH MEDS.
P-CONTINUE TO MONITOR & PROVIDE EMOTIONAL SUPPORT.

## 2020-03-02 NOTE — NUR
P-CONFUSION, ISOLATIVE
 
I-REDIRECTION WITH THERAPEUTIC INTERVENTIONS AND PRESENT REALITY. EDUCATE AND
ENCOURAGE MEDICATION COMPLIANCE
 
R-PATIENT MEDICATION COMPLIANT AT HS. PATIENT ISOLATIVE TO ROOM AND WITH
LIMITED INTERACTION WITH PEERS. PATIENT PROVIDED FLUIDS AND NOURISHMENT AT HS.
PATIENT WITH NO SUICIDAL OR HOMICIDAL IDEATIONS. PATIENT WITH NO
HALLUCINATIONS. PATIENT CAME TO NURSING STATION DURING SHIFT AND REQUESTING TO
"USE THE PHONE TO CALL 911 SO I CAN TALK TO RACHAEL". NURSING STAFF REASSURED
PATIENT THAT SHE WAS IN A SAFE PLACE AND RETURNED TO ROOM AFTER NOT BEING ABLE
TO USE THE TELEPHONE TO CALL 911. PATIENT REFUSED TO SHOWER THIS SHIFT.
 
P-CONTINUE TO ENCOURAGE MEDICATION COMPLIANCE, CONTINUE TO ENCOURAGE REALITY,
ENCOURAGE GROUP THEAPY WHILE AWAKE

## 2020-03-02 NOTE — NUR
TREATMENT PLAN MEETING WAS HELD WITH MISSY RUBIN RN, LISW-S AND DISCHARGE WHEN
STABLE. PT. WILL RETURN HOME AT THIS POINT. WAITING ON AARON MANZO FOR
COMPETENCY EVALUATION THAT WAS REQUESTED BY GIULIA FROM [facility].

## 2020-03-02 NOTE — NUR
PATIENT COMPLAINING OF BEING ANXIOUS, STATING "I'M HAVING A PANIC ATTACK"
ENCOURAGED PATIENT TO DO DEEP BREATING EXERCISES. ONE ON ONE FOR EMOTIONAL
SUPPORT. PATIENT PROVIDED ONE ON ONE IN QUIET ROOM, CHANGE OF ENVIRONMENT WITH
LOW STIMULI. PRN ATIVAN 1 MG GIVEN PO PER REQUEST FOR ANXIETY. PATIENT
COMPLAINED OF HAVING A MIGRAINE. RATING PAIN 7/10. PRN TYLENOL 650MG PO GIVEN
AT THIS TIME. LIGHTS DIMMED AND EFFECTIVE. PATIENT RESTING ON COUCH WITH EYES
CLOSED.

## 2020-03-02 NOTE — NUR
PT APPROACHED THIS NURSE IN A WHISPER WAVING FOR THIS NURSE TO COME WITH HER
AROUND THE CORNER "CALL 911, TELL THEM I NEED TO SPEAK TO THAT NURSE AND THE
OFFICER WHO BROUGHT ME HERE" PT DARTING HER HEAD AND EYES AROUND TO SEE WHO
COULD HEAR HER TALK. PT INSISTENT THAT THIS NURSE NOT TELL ANYONE ABOUT
TALKING WITH HER.
AFTER SOME PROMPTING PT AGREED TO TALK IN HER ROOM WITH THIS NURSE AND DOOR
SHUT: PT WAS CONCERNED THAT THERE IS "EVIL ABOUT THIS PLACE AND THOSE ON EHT
OTHER END  NEED TO KNOW WHO TO SEND" "THEY WILL KNOW WHAT I AM TALKING
ABOUT" "YOU ARE GOOD IN YOUR SOUL AND I KNOW THIS SOUNDS CRAZY, I DON'T
BELEIVE IT MYSELF BUT THEY ARE COMING AND WE NEED TO MAKE SURE AVTAR IS OK, I
DON'T KNOW WHO HE IS BUT HE NEEDS US TO HELP HIM" I ASKED PT IF SHE FELT SAFE
ON OUR UNIT OR IF THERE WAS A REASON WE HAD TO KEEP IT SECRET, " I AM UNSURE
ABOUT THE OTHERS BUT YOU ARE PURE OF HEART, THEY ARE NOT TO BE TRUSTED UNTIL
WE ARE TOLD HOW TO HANDLE THE EVIL THAT SURROUNDS US" THIS NURSE REASSURED HER
THAT NOONE ON OUR UNIT WILL BRING HER HARM AND THAT SHE IS SAFE. PT WITH
ENCOURAGEMENT OF HER SAFETY AND THAT NOONE COULD HEAR US TALKING IN HER ROOM
WITH DOOR SHUT. CONFESSED THAT SHE HASN'T SEEN "ANYONE THAT ISN'T SUPPOSED TO
BE HERE, BUT I AM UNSURE IF THE OTHERS ARE IN DISGUISE"  THIS NURSE PRAYED
WITH PT PER REQUEST AND REASSURED HER THAT WE COULD CONTINUE TO TALK AND THAT
SHE WILL SEE MANY NURSES TO FOLLOW THAT ARE AS "PURE TO HEART"

## 2020-03-02 NOTE — NUR
PATIENT SLEPT 5 HOURS OF INTERRUPTED SLEEP THROUGHOUT SHIFT. Q 15 MINUTES
CHECKS MAINTAINED. 24 HR chart check completed. NO CHANGES IN SKIN INTEGRITY
THIS SHIFT

## 2020-03-02 NOTE — NUR
P: UNDERLINING IRRITABILITY AND INTRUSIVE TOWARDS ROOMMATE AND WITHDRAWN AT
TIMES.
I: ONE ON ONE FOR EMOTIONAL SUPPORT, ENCOURAGE MEDICATION COMPLIANCE SOCIAL
INTERACTINS WITH STAFF AND OTHER PATIENTS.
R: EFFECTIVE. PATIENT IS ALERT AND ORIENTED TO PERSON, PLACE, TIME AND
SITUATION; ABLE TO VOICE NEEDS. MOOD IS IRRITABLE, INTRUSIVE AND WITHDRAWN.
DENIES ANY HALLUCINATIONS, DELUSIONS, HI/SI OR PAIN. NO RESPONSE TO INTERNAL
STIMULI. MEDICATION COMPLIANT WITH EDUCATION. Q 15 MINUTE SAFETY CHECKS
MAINTAINED. ENCOURAGE INTERACTIONS WITH STAFF AND OTHER PATIENTS.
P: CONTINUE TO MONITOR MOOD, MEDICATION COMPLAINCE, OUTBURST AND AUDITORY
HALLUCINATIONS. PROVIDE ONE ON ONE, REDIRECTION/ORIENTATION AS NEEDED.

## 2020-03-03 VITALS — DIASTOLIC BLOOD PRESSURE: 79 MMHG

## 2020-03-03 VITALS — DIASTOLIC BLOOD PRESSURE: 66 MMHG

## 2020-03-03 VITALS — DIASTOLIC BLOOD PRESSURE: 68 MMHG | SYSTOLIC BLOOD PRESSURE: 132 MMHG

## 2020-03-03 NOTE — NUR
TREATMENT PLAN MEETING WAS HELD WITH DR. ELMORE, MISSY RUBIN, RN, AT, LISW-S AND
DISCHARGE PLANNER. PLAN FOR DISCHARGE AT THE END OF THE WEEK. COMPETENCY EVAL
ORDERED. PLAN REMAINS FOR PT. TO RETURN HOME.

## 2020-03-03 NOTE — NUR
P: AUDITORY COMMAND HALLUCINATIONS VOICED WITH SIUCIDAL THOUGHTS. PATIENT
CALLED NURSE TO QUIET ROOM. STATES THAT SHE IF "FEELING OVERWHELMED CARING FOR
MY  WHO HAS LEWIBODY PARKINSON-END STAGE. STATED THAT "HE IS IN A
NURSING HOME IN [place] DYING". CLOSER TO HIS FAMILY. PATIENT ADMITTED TO
HEARING VOICES AND HAVING THOUGHTS OF JUMPING OF THE BRIDGE. "VOICES ARE
TELLING ME TO JUST GO TO SLEEP AND NOT WAKE UP AND TO JUMP OFF THE BRIDGE".
PATIENT ADMIT TO HEARING VOICES OVER THE LAST 3-4 DAYS. OTHER DELUSIONAL
THOUGHTS VOICED "THERE IS SOMETHING HOOKED TO MY EAR FOR WHEN I FEEL
THREATENED THAT I CAN GO UP TO THE NURSES STATION, CALL 911 FOR THE POLICE
OFFIER RACHAEL FROM [place] TO COME UP, THEY WERE SUPPOSE TO TELL THE NURSES
BUT I GUESS THEY DIDN'T BECAUSE THE NURSE DIDN'T KNOW LAST NIGHT"
I: ONE ON ONE FOR EMOTIONAL SUPPORT, REORIENTATION TO REALITY AND REDIRECTION,
VERBAL CONTRACT FOR SAFETY. CARYN CEBALLOS NP UPDATED WITH NEW ORDERS IN
PLACE, CONTINUE Q 15 MINUTE SAFETY CHECKS.
R: EFFECTIVE; PATIENT CONTINUES TO BE SLIGHTLY PARANOID. PATIENT IS ALERT AND
ORIENT TO PERSON, PLACE, TIME AND SITUATION; ABLE TO VOICE NEEDS. MOOD IS
ANXIOUS AND DEPRESSED. DENIES HI OR PAIN. HAVING AUDITORY HALLUCINATIONS WITH
SUICIDAL IDEATIONS. MEDICATION COMPLAINT WITH EDUCATION PROVIDED. Q 15 MINUTE
SAFETY CHECKS MAINATINED. INDEPENDENT WITH ACTIVITIES OF DAILY LIVING,
CONTINENT OF BOWEL AND BLADDER. SET UP FOR MEALS, INTAKES ARE GOOD WITH
ADEQUATE FLUIDS. AMBULATORY WITH STEADY GAIT. INTERACTIVE WITH STAFF AND
PARTICIPATES IN GROUP SESSIONS. CAN BE ISOLATIVE AT TIMES.
P: CONTINUE TO MONITOR FOR HALLUCINATIONS, SUICIDEAL IDEATIONS, MOOD; PROVIDE
ONE ON ONE FOR EMOTIONAL SUPPORT, REORIENTATION TO REALITY, REDIRECT AND
CONTRACT FOR SAFETY. UPDATE DOCTORS WITH ANY UPDATES.

## 2020-03-03 NOTE — NUR
PT HAS BEEN UP AMBULATING THE CARRION. STATED THAT SHE CAN NOT SLEEP. CAME TO
NURSES STATION & STATED, "CAN YOU CALL 911 & ASK FOR BRNADON. I'M SUPPOSE TO HAVE
POLICE PROTECTION". INFORMED PT SHE IS IN THE HOSPITAL & THERE IS NO POLICE
HERE BUT SECURITY GUARDS COME ON THE UNIT FREQUENTLY. SHE STATED, "OK" &
WALKED AWAY.

## 2020-03-03 NOTE — NUR
PATIENT COMPLAINING OF HEART PALPATIONS, CHEST PAIN AREA OF BELOW LEFT NIPPLE;
RATING PAIN 7/10. VITALS: 97.6, 137/79, APICAL 100, 16 %RA, NO
DIPHORESIS, EMESIS OR FACIAL GRIMACING NOTED. PATIENT CONCERNED ABOUT HAVING
ANY VISTORS. DR. TELLEZ NOTIFIED

## 2020-03-03 NOTE — NUR
PT'S NURSE REPORTED TO THIS RN THAT PT HAD REPORTED COMMAND HALLUCINATIONS
TELLING HER TO "JUMP OFF THE BRIDGE" OR TO "GO TO SLEEP AND NOT WAKE UP".
PT VERBALLY CONTRACTED FOR SAFETY. PT ALSO REPORTING THAT SHE HAS A DEVICE IN
HER EAR THAT SHE CAN UTILIZE TO CALL 911 WHEN SHE FEELS THREATENED AND THEN
"RACHAEL" FROM THE POLICE WITH CALL TO PROTECT HER. JT PMHNP-BC UPDATED
WITH THE ABOVE, GAVE VERBAL ORDERS: INCREASE RISPERDAL TO 1MG PO AT HS, 0.5MG
PO IN THE MORNINGS. CONTINUE Q15 MIN SAFETY CHECKS. READ BACK AND VERIFIED.

## 2020-03-03 NOTE — NUR
PM GROUP/CRAFTS
PT ATTENDED AFTERNOON GROUP THERAPY AND PARTICIPATED IN ALL ACTIVITIES. PT WAS
ON TASK AND HELPFUL WITH PEERS. PT EXPRESSED NO DELUSIONS OR HALLUCINATIONS
WHILE IN GROUP

## 2020-03-03 NOTE — NUR
P-DEPRESSED, ISOLATIVE
I-1:1 VERBAL INTERVENTION, ADMINISTER MEDS, MONITOR SLEEP
R-PT IS LESS ISOLATIVE. AMBULATES INDEPENDENTLY. ATE SNACK. ALERT & ORIENTED
X 4. DEPRESSED MOOD. DOES C/O FEELING ANXIOUS SOMETIMES & FEELS SHE HAD A
"PANIC ATTACK" EARLIER. DENIES ANY CHEST PAIN. DENIES ANY SUICDIAL FEELINGS.
DENIES ANY SENSORY DISTRURBANCE & NONE IS EVIDENT. DID STATE THAT HER 
IS SO SICK & HE IS HARD TO TAKE CARE OF. COMPLAIANT WITH MEDS.
P-CONTINUE TO MONITOR & PROVIDE EMOTIONAL SUPPORT.

## 2020-03-03 NOTE — NUR
AM GROUP/EXERCISE AND PARACHUTE
PT ATTENDED MORNING GROUP THERAPY AND PARTICIPATED IN ALL ACTIVITIES. PT WAS
ENGAGED AND EXPRESSED NO PARANOIA OR ANXIETY WHILE IN GROUP

## 2020-03-04 VITALS — DIASTOLIC BLOOD PRESSURE: 64 MMHG | SYSTOLIC BLOOD PRESSURE: 110 MMHG

## 2020-03-04 VITALS — DIASTOLIC BLOOD PRESSURE: 69 MMHG

## 2020-03-04 LAB
AMPHETAMINES UR QL SCN: < 1000
BARBITURATES UR QL SCN: < 200
BENZODIAZ UR QL SCN: > 200
BZE UR QL SCN: < 300
CANNABINOIDS UR QL SCN: < 50
METHADONE UR QL SCN: < 300
OPIATES UR QL SCN: < 300
PCP UR QL SCN: <  25

## 2020-03-04 NOTE — NUR
TREATMENT PLAN MEETING WAS HELD WITH DR. ELMORE, MISSY RUBIN, RN, AT, LISW-S AND
DISCHARGE PLANNER. PLAN FOR DISCHARGE NEXT WEEK. WORKING WITH APS ON DISCHARGE
PLANS.

## 2020-03-04 NOTE — NUR
Spoke with Kathryn Daniel of [facility] Protective [facility] and
informed her of JOHN Crisostomo PhD evaluation of pt. Discussed pt's current status
and discharge concerns. Kathryn requests continued updates of pt.

## 2020-03-04 NOTE — NUR
P: UNDERLINING IRRITABILITY, EXIT SEEKING WITH EYE DARTING.
I: ONE ON ONE FOR EMOTIONAL SUPPORT, ENCOURAGE MEDICATION COMPLIANCE SOCIAL
INTERACTINS WITH STAFF AND OTHER PATIENTS.
R: EFFECTIVE. PATIENT IS ALERT AND ORIENTED TO PERSON, PLACE, TIME AND
SITUATION; ABLE TO VOICE NEEDS. MOOD IS IRRITABLE AND EXIT SEEKIING.
DENIES ANY HALLUCINATIONS, DELUSIONS, HI/SI OR PAIN. RESPONDING TO INTERNAL
STIMULI WITH EYE DARTING.  MEDICATION COMPLIANT WITH EDUCATION. Q 15 MINUTE
SAFETY CHECKS MAINTAINED. ENCOURAGE INTERACTIONS WITH STAFF AND OTHER
PATIENTS.
P: CONTINUE TO MONITOR MOOD, MEDICATION COMPLAINCE, EXIT SEEKING, OUTBURST AND
AUDITORY HALLUCINATIONS. PROVIDE ONE ON ONE, REDIRECTION/ORIENTATION AS
NEEDED.

## 2020-03-04 NOTE — NUR
PT RESTING QUIETLY IN BED.
NO c/o discomfort. Respirations easy and regular.
Vital signs stable. No overt distress.
 
AWA MATIAS

## 2020-03-04 NOTE — NUR
Pt has refused IV for IV contrast for head CT despite multiple attempts and
education. Pt states "I don't want the IV right now. No IVs. I just lost my
 today. He's been really really sick with lewy body dementia and
parkinsons like Souleymane Rodarte had". This RN asked pt how she knew her 
had passed. Pt states "my friends came and told me last night and then I got
a call from my son this morning". Pt did not have any visitors last night and
per unit coordinator pt actually talked to her  on the phone earlier
this date. [facility] updated, states ok to do head CT without
contrast. CT department updated.

## 2020-03-05 VITALS — DIASTOLIC BLOOD PRESSURE: 82 MMHG

## 2020-03-05 VITALS — DIASTOLIC BLOOD PRESSURE: 76 MMHG

## 2020-03-05 NOTE — NUR
Called and notified Dr. Koch regarding patient's anxiety increasing and that
Ativan had  on mar. Dr. Koch gave renewel order for Ativan.

## 2020-03-05 NOTE — NUR
PM GROUP
PT WAS IN HER ROOM AT THE START OF GROUP AND WAS ENCOURAGED TO ATTEND. PT WAS
SEATED ON THE EDGE OF HER BED WITH HER HEAD IN HER HANDS. OUT OF CONCERN, I
ASKED PT, "ARE YOU OKAY?" AND STEPPED INTO THE ROOM. PT REPLIED, "NO! MY SON
IS BEING NAILED TO A CROSS!" I REPLIED, "ILSA, DO YOU REALIZE THAT THAT
THOUGHT IS NOT REAL?" PT YELLED, "GET OUT OF MY ROOM NOW! YOU'RE NOT EVEN A
NURSE, YOU'RE A NOBODY!" I EXITED THE ROOM AND LEFT PT TO HERSELF.

## 2020-03-05 NOTE — NUR
P: PT ON ELOPEMENT PRECAUTIONS, OBSERVED TO BE RUNNING TOWARDS THE EXIT DOOR
WHEN SOMEONE IS ENTERING OR EXITING THE UNIT. PT IRRITABLE WITH STAFF WHEN
ASKED WHY  SHE IS HAVING THIS BEHAVIORS PT STATED "IT'S NONE OF YOUR [employer]
BUSINESS." PT REFUSING MEALS. PT ISOLATIVE TO ROOM THIS MORNING.
 
I: PROVIDE EMOTIONAL SUPPORT AND 1:1 FOR PT TO VOICE FEELLINGS, ENCOURAGE MED
COMPLIANCE AND PROVIDE MED EDUCATION, ENCOURAGE GROUP PARTICIPATOIN AND
SOCIALIZATION, MAINTAIN ELOPEMENT PRECAUTIONS
 
R: PT ALERT TO PERSON, PLACE, TIME AND SITAUTION. REFUSED BREAKFAST AND
CONSUMED 5% OF LUNCH BEFORE CONTINUING TO RUN TOWARDS THE ERXIT DOOR.PT
REMAINS ISOLATIVE TO HER ROOM THROUGHOUT THE DAY. PT DENIES ANY SUICIDAL
THOUGHTS. NO HALLUCINATIONS OR DELUSIONS NOTED AT THIS TIME. PT AMBULATORY
THROUGHOUT UNIT, GAIT STEADY. PT CONTINENT OF BOWEL AND BLADDER.
 
P: MONIITOR PT BEHAVIORS ON Q15 MIN SAFETY CHECKS, ENCOURAGE MED COMPLIANCE
AND PROVIDE MED EDUCATION, ENCOURAGE GROUP PARTICIPATION AND SOCIALIZATION,
ENCOURAGE PO INTAKE, PROVIDE EMOTIONAL SUPPORT AND 1:1 FOR PT TO VOICE
FEELINGS.

## 2020-03-05 NOTE — NUR
TREATMENT PLAN MEETING WAS HELD WITH DR. ELMORE, RN, AT, LISW-S AND DISCHARGE
PLANNER. PLAN FOR DISCHARGE NEXT WEEK. PASRR SUBMITTED AND PENDING.

## 2020-03-05 NOTE — NUR
PASRR COMPLETED ONLINE IN HENS. REQUIRES FURTHER REVIEW FOR NURSING FACILITY
PLACEMENT. SUPPORTING DOCUMENTATION FAXED TO [facility] [phone].

## 2020-03-05 NOTE — NUR
PATIENT SITTING IN THE QUIET ROOM, MENTAL HEALTH SPECIALIST WENT IN WITH
PATIENTS DINNER. PATIENT YELLED AT MENTAL HEALTH SPECIALIST TO GET OUT BECAUSE
SHE IS THE MESIAH AND HER SON JUST  ON THE CROSS TODAY. WHEN THIS NURSE
TRIED TO APPROACH PATIENT FOR 1:1 THERAPEUTIC INTERACTION, PATIENT YELLED TO
GET OUT. PROVIDED PATIENT WITH SPACE. PATIENT THEN WENT TO THE END OF THE CARRION
NOTED TO BE TALKING TO UNSEEN OTHERS TOWARDS THE WINDOW. PATIENT RAISED HER
HANDS UP TOWARDS THE WINDOW AND SEEMED TO MAKE A MOTIONING MOVEMENT WITH ARMS
DOWN THE CARRION. PT WAS TELLING OTHER PEERS TO GET OUT OF THE WAY BECAUSE THERE
IS SOMETHING GOING TO BE COMING OUT OF THE WINDOW. WHEN TRYING TO APPROACH
PATIENT, SHE WAS ARGUMENTATIVE AND IRRITABLE. PATIENT THEN WENT INTO HER ROOM
AND PACING BACK AND FORTH WITH A BLANKET DRAPPED OVER HER SHOULDERS. PATIENT
SITTING IN HER ROOM, ON THE CHAIR, LOOKING AROUND THE ROOM. PT MORE CALM AT
THIS TIME. WILL CONTINUE TO MONITOR PATIENT FOR IRRITABILITY/ANXIOUS. Q15
MINUTE CHECKS MAINTAINED FOR SAFETY.

## 2020-03-06 VITALS — DIASTOLIC BLOOD PRESSURE: 71 MMHG

## 2020-03-06 VITALS — DIASTOLIC BLOOD PRESSURE: 78 MMHG

## 2020-03-06 NOTE — NUR
AM GROUP
PT IS UNABLE TO PARTICIPATE IN GROUP THERAPY DUE TO COGNITIVE IMPAIRMENT AND
IS A DISRUPTION TO THE PURI MILIEU.

## 2020-03-06 NOTE — NUR
TREATMENT PLAN MEETING WAS HELD WITH MISSY RUBIN, RN, AT, LISW-S AND DISCHARGE
PLANNER. PLAN FOR DISCHARGE NEXT WEEK. PT. MAY REQUIRE PLACEMENT. WILL FOLLOW.

## 2020-03-06 NOTE — NUR
PM GROUP
PT IS UNABLE TO ATTEND OR PARTICIPATE IN GROUP THERAPY AT THIS TIME DUE TO
COGNITIVE IMPAIRMENT AN IS A DISRUPTION TO THE PURI MILIEU

## 2020-03-06 NOTE — NUR
PATIENT AWOKE, PACING HALLWAYS WITH STEADY GAIT, EXIT SEEKING, STATING TO
STAFF "THIS IS MY HOUSE" AND "IM THE DAUGHTER OF SHALA, MY SON  ON THE
CROSS FOR MY SINS YESTERDAY". PT CONTINUES TO BE UNRECEPTIVE TO REALITY
PRESENTATION AND REDIRECTED BACK TO HER ROOM. WILL CONTINUE TO MONITOR FOR
ESCALATING BEHAVIORS.

## 2020-03-06 NOTE — NUR
P- LABILE MOOD, BIZARRE BEHAVIORS, VISUAL AND AUDITORY HALLUCINATIONS,
GRANDIOSE DELUSIONS, RELIGIOUSLY FIXATED, ATTEMPTING TO ELOPE UNIT,
AGITATED/THREATENING, PHYSICALLY COMBATIVE WITH STAFF UPON ATTEMPTS TO
REDIRECT.
I- ORIENATTION, MOOD AND BEHAVIORS ASSESSED. ASSESSED PT FOR SI/HI, INTENT OR
PLAN. ASSESSED PT FOR S/S HALLUCINATIONS, PARANOIA AND/OR DELUSIONS.
MEDICATIONS ADMINISTERED AS PER PHYSICIAN'S ORDERS. ASSISTANCE WITH ADL CARE
PROVIDED AS NEEDED. CONTINUE TO ENCOURAGE MEDICATION COMPLIANCE AS WELL AS
GROUP ATTENDANCE AND PARTICIPATION.
R- PT IS ALERT AND ORIENTED TO NAME, ANSWERS WHEN STAFF CALLS "ILSA" BUT
THEN PT STATES "DON'T YOU KNOW WHO I AM? I'M THE MESSIAH. THE SON OF GOD".
MOOD IS LABILE, AFFECT INAPPROPRIATE. PT NOTED RESPONDING TO VISUAL/AUDITORY
HALLUCINATIONS, SEEING GESTURING AT, YELLING AT AND ARGUING WITH UNSEEN
OTHERS. PT VOICES VARIOUS GRANDIOSE DELUSIONS WITH Sabianist FIXATION, PT
CONTINUES TO STATE THAT SHE IS THE MESSIAH, "THE CHOSEN ONE", PT STATES SHE IS
"GOING AWAY TO [place]". PT ATTEMPTING TO ELOPE, RUNNING TOWARD AND POUNDING
ON UNIT EXIT DOOR. PT STATES "YOU WILL OPEN THIS DOOR FOR ME, I AM THE SHALA.
MY SON IS ON THE OTHER SIDE OF THIS DOOR AND HE WAS CRUCIFIED ON THE CROSS FOR
YOUR SINS AND MINE". PT UNRECEPTIVE TO REALITY PRESENTATION. PT BECOMES
AGITATED AND PHYSICALLY COMBATIVE UPON ATTEMPTS TO REDIRECT. PT GIVEN PRN
GEODON 20MG IM TO LEFT DELTOID AT 1359 PER VERBAL ORDER FROM .
MEDICATION EFFECTIVE. PT RESTING QUIETLY IN BED FROM 3PM ON. EASILY AROUSABLE
VIA VERBAL/TACTILE STIMULI. FOOD AND FLUIDS PROVIDED. NO DISTRESS NOTED.
P- PLAN TO CONTINUE CURRENT TREATMENT, CONTINUE TO MONITOR MOOD AND BEHAVIORS,
PROVIDE APPROPRIATE REORIENTATION, REDIRECTION AND 1:1 AS NEEDED. CONTINUE TO
ENCOURAGE MEDICATION COMPLIANCE AS WELL AS GROUP ATTENDANCE AND PARTICIPATION.

## 2020-03-06 NOTE — NUR
Spoke with Kathryn Daniel of [facility] and
provided her with pt update. Because pt's psychosis is not resolving,
emergency guardianship will be sought for pt. This writer will attempt to
reach pt's son to inform him of this and inquire if he would like to be the
guardian of pt.

## 2020-03-06 NOTE — NUR
Spoke with pt's son Doug Qureshi and provided him with pt's current status.
Shared concerns with him in regards to pt's continuing psychosis/behaviors and
discharge plan should pt not return to previous status prior to [facility]
admission. Informed Doug of the Statement of Expert Evaluation that has been
completed and educated him about guardianship. Provided Doug with contact
number for Kathryn Daniel of [facility]. Doug stated that he will
call Kathryn today. Doug stated that he has been concerned about the pt. He
voiced that pt will be doing well and then have these changes to her mental
state, as well as displaying bizarre behaviors. Doug stated that he believes
it happens approximately 6x a year for many years. Discussed discharge options
should pt not improve to the degree needed for community living. Explained
that pt may need a behavioral unit. This writer will plan to speak to Doug
on Monday, 3/9, to further discuss pt status and discharge needs.

## 2020-03-06 NOTE — NUR
PATIENT OBSERVED RUNNING DOWN HALLWAY TOWARD THE MAIN DOORS, EXIT SEEKING, AND
GESTURING TOWARDS STAFF, STATING "IM THROWING HOLY WATER AT YOU GUYS" AND
"YOU KNOW WHO I AM, IM THE MESSIAH". PT ALSO ATTEMPTED TO HIT/GRAB AT STAFF.
PT ASSISTED TO A IKE CHAIR AND BROUGHT NEAR STAFF AT THIS TIME DUE TO LACK OF
SAFETY AWARENESS. WILL CONTINUE TO MONITOR FOR ESCALATING BEHAVIORS.

## 2020-03-06 NOTE — NUR
PATIENT OBSERVED ON Q 15 MIN CHECKS TO HAVE SLEPT APPROX 4 HOURS
UNINTERRUPTED. PT SITTING QUIETLY IN IKE CHAIR BY NURSES STATION AT THIS TIME
WITH EYES CLOSED. NO SIGNS OR SYMPTOMS OF DISTRESS NOTED.

## 2020-03-06 NOTE — NUR
P-DELUSIONAL, AUDITORY AND VISUAL HALLUCINATIONS, MEDICATION NON-COMPLIANCE
I-PROVIDE 1:1 WITH THERAPEUTIC INTERVENTIONS. PRESENT REALITY AND REORIENT.
ENCOURAGE MEDICATION COMPLIANCE AND EDUCATE. MONITOR SLEEP.
R-PT OBSERVED BY STAFF TALKING TO UNSEEN OTHERS WHILE SITTING ALONE IN HER
ROOM SINCE BEGINNING OF SHIFT. WHEN APPROACHED BY THIS RN AS TO WHO SHE IS
SPEAKING TO, PATIENT POINTED TOWARD HER BED ROOM WINDOW AND STATED, "DON'T YOU
SEE WHAT IS ALL GOING ON OVER THERE, DON'T YOU KNOW WHAT'S GOING ON, DON'T YOU
EVER WATCH TV". PT UNRECEPTIVE REALITY PRESENTATION, IRRITABLE, STATES "DON'T
YOU KNOW WHO I AM, IM THE SON OF THE LIVING GOD, MY SON  ON THE CROSS
TODAY". PT SELECTIVE WITH MEDICATIONS AND REFUSED HS DOSE OF RISPERDAL STATING
"IT WILL MAKE ME THIRSTY, I WILL NOT TAKE IT, NOPE, NOPE, NOPE". PT ALSO
REFUSED PRN DOSE OF ATIVAN SHE REQUESTED WHILE AGAIN POINTING AT HER WINDOW
STATING "MY FRIEND FELIPE PORTER OVER THERE IS AN RN AND SHE TOLD ME NOT TO
TAKE IT". PT WITH INCREASED AGITATION WITH REDIRECTION AND OBSERVED CHARGING
TOWARD STAFF STATING "IF YOU DON'T GET OUT OF MY ROOM, I WILL KNOCK EVERYTHING
OF YOURS OVER" AND "DON'T YOU KNOW WHO I AM, IM ILSA WILLINGHAM". PT RECEIVED
PRN GEODON 10MG IM IN RIGHT DELTOID AT 2206 AS ORDERED BY PHYSICIAN DUE TO ALL
NON-PHARMOLOGICAL INTERVENTIONS BEING INEFFECTIVE, PT TOLERATED INJECTION
WELL. PT THEN ASSISTED TO QUIET ROOM NEAR NURSES STATION DUE TO LACK OF SAFETY
AWARENESS UNTIL APPROX  WHEN SHE GOT A GLASS OF WATER AND THEN WENT TO HER
ROOM, GAIT STEADY. PT CURRENTLY LAYING DOWN WITH EYES CLOSED, RESPIRATIONS
EASY AND REGULAR, NO SIGNS OR SYMPTOMS OF DISTRESS NOTED. PRN GEODON
EFFECTIVE.
P-CONTINUE TO MONITOR MOOD AND BEHAVIORS. MAINTAIN Q 15 MIN CHECKS AND PRN FOR
SAFETY. MONITOR SLEEP.

## 2020-03-07 VITALS — DIASTOLIC BLOOD PRESSURE: 72 MMHG | SYSTOLIC BLOOD PRESSURE: 116 MMHG

## 2020-03-07 VITALS — DIASTOLIC BLOOD PRESSURE: 85 MMHG | SYSTOLIC BLOOD PRESSURE: 111 MMHG

## 2020-03-07 LAB
APPEARANCE UR: (no result)
BACTERIA #/AREA URNS HPF: (no result) /[HPF]
BILIRUB UR QL STRIP: NEGATIVE
CASTS URNS QL MICRO: (no result)
COLOR UR: YELLOW
CRYSTALS URNS MICRO: (no result)
EPI CELLS #/AREA URNS HPF: (no result) /[HPF]
GLUCOSE UR QL: NEGATIVE
HGB UR QL STRIP: (no result)
KETONES UR QL STRIP: (no result)
LEUKOCYTE ESTERASE UR QL STRIP: (no result)
NITRITE UR QL STRIP: NEGATIVE
PH UR STRIP: 6 [PH] (ref 5–9)
RBC #/AREA URNS HPF: (no result) RBC/HPF (ref 0–2)
SP GR UR: 1.02 (ref 1–1.03)
UROBILINOGEN UR STRIP-MCNC: < 0.2 E.U./DL (ref 0.2–1)
WBC #/AREA URNS HPF: (no result) WBC/HPF (ref 0–5)

## 2020-03-07 NOTE — NUR
P-LABILE MOOD, DELUSIONAL, EXIT SEEKING.
I-PRESENT REALITY AND REORIENT. PROVIDED 1:1 WITH THERAPEUTIC INTERVENTIONS.
ENCOURAGE MEDICATION COMPLIANCE AND EDUCATE. MAINTAIN ELOPEMENT PRECAUTIONS.
MONITOR SLEEP.
R- PT INTERMITTENTLY PACES HALLWAY, PUSHES ON DOORS, STATING "IM LOOKING FOR
THE [facility] HO LAST", EASILY REDIRECTABLE. PT OTHERWISE
ISOLATIVE TO SELF AND ROOM, GUARDED. DURING 1:1 PT STATED TO STAFF "I THINK IM
DOING BETTER TODAY, IM NOT AS WORKED UP AS YESTERDAY, THAT SHOT HELPED". PT
DENIES SI/HI, HALLUCINATIONS, OR PAIN. NO NOTED RESPONDING TO INTERNAL
STIMULI. MEDICATION COMPLIANT WITHOUT DIFFICULTY AFTER REVIEW. PT INDEPENDENT
IN ADL'S, CONTINENT OF BOWEL AND BLADDER. PT CURRENTLY SITTING UP IN A CHAIR
IN HER ROOM NAKED, REQUIRES FREQUENT REDIRECTION BY STAFF TO NOT LEAVE
ROOM UNLESS CLOTHED. NO COMBATIVE BEHAVIORS OBSERVED. NO SIGNS OR SYMPTOMS OF
DISTRESS NOTED.
P-CONTINUE TO MONITOR MOOD AND BEHAVIORS. PRESENT REALITY AND REDIRECT AS
NEEDED. MAINTAIN Q 15 MIN CHECKS AND PRN FOR SAFETY.

## 2020-03-07 NOTE — NUR
PATIENT PACING HALLWAY, EXIT SEEKING, BECOMES ARGUMENTATIVE/IRRITABLE WITH
REDIRECTION. STATING TO STAFF "IM THE MESSIAH, I DO WHAT I WANT". WILL
CONTINUE TO MONITOR FOR ESCALATING BEHAVIORS.

## 2020-03-07 NOTE — NUR
PT DELUSIONAL, Caodaism AND GRANDIOSE. PT STATES SHE IS GOD AND THE SON OF
LIVING GOD. PT ALSO CONSTIPATED WITH NO REPORTED BM SINCE 3/4.
 
PT PRESENTED WITH REALITY. PRN MOM ADMINISTRATION PER ORDER. PT ASSESSED FOR
SLEEP QUALITY AND APPETITE. ASSESSED FOR DEPRESSED MOOD OR FEELINGS OF
ANXIOUSNESS.
 
PT ORIENTED X 3. STATES SHE IS SLEEPING AND EATING OKAY. UNRECEPTIVE TO
REALITY PRESENTATION. MOOD IS STABLE. AFFECT IS CONGRUENT WITH MOOD. PT DENIES
DEPRESSION, STATES SHE IS A LITTLE ANXIOUS. PT CURRENTLY IN ROOM READING.
 
WILL CONTINUE TO PRESENT REALITY. WILL CONTINUE TO MONITOR PT'S BOWEL
MOVEMENTS. WILL CONTINUE TO ENCOURAGE MEDICATION COMPLIANCE. Q 15 MIN
MONITORING

## 2020-03-07 NOTE — NUR
PM GROUP/CRAFTS/MUSIC
PT WANDERING HALLS AND ENCOURAGED TO ATTEND GROUP PT POLITELY DECLINES BUT IS
STILL ENCOURAGED TO ATTEND IF SHE CHANGES HER MIND. PT TALKING TO UNSEEN
OTHERS IN THE HALLWAY. PT WILL CONTINUE TO BE ENCOURAGED TO ATTEND AN
DPARTICIPATE IN FUTRUE GROUP SESSIONS.

## 2020-03-07 NOTE — NUR
Patient ambulating t/o unit with no c/o discomfort. Respirations easy and
regular.  Vital signs stable. No overt distress.
 
AWA MATIAS

## 2020-03-07 NOTE — NUR
ROSA MARIA MORALES CNP ON UNIT TO ASSESS PT. PT SITTING ON THE FOOT OF HER BED
LOOKING OUT THE WINDOW. PT STATED TO ROSA MARIA "I AM LEAVING TODAY. I AM WAITING
ON HO TO COME THROUGH THE WINDOW TO PICK ME UP" PT CONTINUES TO STARE OUT
THE WINDOW.

## 2020-03-08 VITALS — DIASTOLIC BLOOD PRESSURE: 67 MMHG

## 2020-03-08 VITALS — SYSTOLIC BLOOD PRESSURE: 122 MMHG | DIASTOLIC BLOOD PRESSURE: 70 MMHG

## 2020-03-08 NOTE — NUR
PT C/O BACK, LEG, AND ARM PAIN. REQUESTED TYLENOL. PRN TYLENOL 650 MG GIVEN AT
THIS TIME. WILL MONITOR EFFECTIVENESS OF MEDICATION.

## 2020-03-08 NOTE — NUR
ATTEMPTED TO COMPLETE A SKIN ASSESSMENT ON PT. PT REFUSED SKIN ASSESSMENT AND
STATED SHE DOESNT HAVE ANY WOUNDS. WILL REAPPROACH PT FOR A SKIN ASSESSMENT.

## 2020-03-08 NOTE — NUR
P-ISOLATIVE
 
I-PROVIDE 1:1, ASSESS ORIENTATION, ADMINISTER MEDS, MONITOR SLEEP
 
R-PT IS PLEASANT DURING VERBAL INTERVENTION. ALERT TO PERSON, PLACE & TIME.
NO BIZAARE BEHAVIORS NOTED. DENIES SENSORY DISTURBANCE & NONE IS EVIDENT. PT
KEEPS TO HERSELF & STAYS IN HER ROOM. DID COME TO THE DINING ROOM & ATE SNACK
TOOK MEDS WHOLE.
 
P-CONTINUE TO MONITOR & PROVIDE ASSISTANCE & EMOTIONAL SUPPORT WHEN NEEDED.

## 2020-03-08 NOTE — NUR
P: ISOLATIVE/WITHDRAWN
 
I: PROVIDED 1:1 FOR THERAPEUTIC COMMUNICATION. MONITORED BEHAVIORS WITH Q15
MINUTE SAFETY CHECKS. ENCOURAGED MEDICATION COMPLIANCE. ENCOURAGED PT TO
INCREASE INTAKE OF FLUIDS AND FOOD. REORIENTED AND REDIRECTED WHEN NEEDED.
ENCOURAGED PT TO ATTEND/PARTICIPATE IN GROUP AND INCREASE INTERACTIONS WITH
PEERS AND STAFF.
 
R: PT A&O X3 WITH PERIODS OF CONFUSION. DEPRESSED MOOD. CONTINUES TO BE
ISOLATIVE AND WITHDRAWN. PT REFUSED BREAKFAST AND LUNCH AFTER MULTIPLE PROMPTS
AND MUCH ENCOURAGMENT. CONTINUES TO HAVE PERIODS OF CONFUSION AND DELUSIONS.
PT STATED KEO WAS COMING TO  HER RINGS THAT WERE LYING ON HER BED.
PT PROMPTED TO PUT RINGS ON HER FINGER OR THEY COULD BE LOCKED UP. PT REFUSED
TO HAVE THEM LOCKED UP AND PLACED 2 SILVER RINGS BACK ON HER FINGER. CONTINUES
TO DENY SADNESS AND DEPRESSION ALONG WITH SI/HI. CONTINUES TO BE
ISOALTIVE/WITHDRAWN. NO HALLUCINATIONS AT THIS TIME. SLEEPS WELL. MEDICATION
COMPLIANT WITHOUT DIFFICULTY. NO Episcopalian PREOCCUPATIONS NOTED AT THIS TIME.
 
P: PROVIDE 1:1 FOR THERAPEUTIC COMMUNICATION. MONITOR BEHAVIORS WITH Q15
MINUTE SAFETY CHECKS. ENCOURAGE MEDICATION COMPLIANCE. ENCOURAGE PT TO
INCREASE INTAKE OF FLUIDS AND FOOD. REORIENT AND REDIRECT WHEN NEEDED.
ENCOURAGE PT TO ATTEND/PARTICIPATE IN GROUP AND INCREASE INTERACTIONS WITH
PEERS AND STAFF.
 
SEE [facility] FLOWSHEET FOR SPECIFIC MONITORING.

## 2020-03-09 VITALS — DIASTOLIC BLOOD PRESSURE: 63 MMHG | SYSTOLIC BLOOD PRESSURE: 116 MMHG

## 2020-03-09 VITALS — DIASTOLIC BLOOD PRESSURE: 63 MMHG

## 2020-03-09 NOTE — NUR
EVENING/BINGO!
PT CHOSE NOT TO ATTEND OR PARTICIPATE. PT DID ATTEND DURING SNACK TIME BUT
IMMEDITALEY LEFT AFTER EATING.

## 2020-03-09 NOTE — NUR
TREATMENT PLAN MEETING WAS HELD WITH DR. ELMORE, MISSY RUBIN, RN, AT, LISW-S AND
DISCHARGE PLANNER. PLAN FOR DISCHARGE AT THE END OF THE WEEK. PASRR PENDING
FOR PLACEMENT.  IS WORKING WITH FAMILY ON DISCHARGE PLANS. UNSURE
AT THIS TIME IF PT. WILL REQUIRE PLACEMENT. APS IS WORKING WITH DISCHARGE
PLANNING.

## 2020-03-09 NOTE — NUR
Spoke with pt's son Doug and discussed pt's status. Confirmed with Doug
that he had been in contact with Kathryn Daniel of [facility] Adult
Protective Services about emergency guardianship. Discussed NFs with
behavioral units. First choice is [facility]. Second is [facility].

## 2020-03-09 NOTE — NUR
PT IS ISOLATIVE TO ROOM AND WITHDRAWN TO SELF.
 
ENCOURAGED TO ATTEND GROUP THERAPY FOR SOCIALIZATION AND SUPPORT. MEDICATIONS
ADMINISTERED PER ORDERS.
 
PT DECLINED TO GO TO GROUP, ONLY COMING OUT OF HER ROOM FOR MEALS AND THEN
QUICKLY PROCEEDS BACK TO HER ROOM.
 
WILL CONTINUE TO MONITOR PER POLICY FOR SAFETY. WILL CONTINUE TO ENCOURAGE PT
TO ATTEND AND PARTICIPATE IN GROUP THERAPY AS PER TREATMENT PLAN. WILL
CONTINUE TO ENCOURAGE MEDICATION COMPLIANCE.

## 2020-03-10 VITALS — DIASTOLIC BLOOD PRESSURE: 69 MMHG | SYSTOLIC BLOOD PRESSURE: 117 MMHG

## 2020-03-10 VITALS — SYSTOLIC BLOOD PRESSURE: 100 MMHG | DIASTOLIC BLOOD PRESSURE: 58 MMHG

## 2020-03-10 NOTE — NUR
P-ISOLATIVE
I-PROVIDED 1:1 WITH THERAPEUTIC INTERVENTIONS. ENCOURAGED MEDICATION
COMPLIANCE AND EDUCATED. MONITOR SLEEP
R-PATIENT ALERT AND ORIENTED X4, IN ROOM AND BED SINCE BEGINNING OF SHIFT. PT
PLEASANT UPON APPROACH STATING SHE IS DOING FINE, JUST READY FOR BED. NO
BIZARRE BEHAVIORS OBSERVED. NO NOTED RESPONDING TO INTERNAL STIMULI.
MEDICATION COMPLIANT WITHOUT DIFFICULTY AFTER REVIEW. NO PHYSICAL COMPLAINTS
VOICED.
P-CONTINUE TO MONITOR MOOD AND BEHAVIORS. MAINTAIN Q 15 MIN CHECKS AND PRN FOR
SAFETY.

## 2020-03-10 NOTE — NUR
PT IS DELUSIONAL, CONFUSED, REPETITIVE.
 
PT ASSESSED FOR ORIENTATION LEVEL, MOOD AND AFFECT. ASSESSED FOR SI/HI.
ASSESSED FOR HALLUCINATIONS AND DELUSIONS.
 
PT ALERT, ORIENTED X 4. MOOD IS DEPRESSED, PREOCCUPIED WITH LEAVING.
REPEATEDLY STATES THAT DR. ELMORE TOLD HER SHE IS LEAVING. STATES THAT HE TOLD
THE STAFF TO GET A W/C BECAUSE SHE IS LEAVING. ALSO STATING THAT HER FAMILY IS
OUTSIDE THE DOOR WAITING, ASKING WHAT TIME TO TELL THEM TO COME AND GET HER.
PT INITIALLY REFUSED INVEGA SUSTEN[facility]; HOWEVER, AFTER EDUCATION AND
REDIRECTION, PT STATED SHE WOULD ACCEPT. INVEGA 156 IM GIVEN TO RIGHT DELTOID
PER ORDER. PT TOLERATED WELL. PT CONTINUES TO REQUIRE MUCH REDIRECTION AND
REALITY PRESENTATION.
 
WILL CONTINUE TO MONITOR PATIENT'S MOOD AND BEHAVIORS. WILL PRESENT REALITY
AND REDIRECT AS APPROPRIATE. WILL ENCOURAGE CONTINUED MEDICATION COMPLIANCE. Q
15 MIN MONITORING PER LYDIA FOR SAFETY.

## 2020-03-10 NOTE — NUR
EVENING/CRAFT/GAME
PT IN ATTENDNACE THE LAST 45 MINUTES OF GROUP. PT PARTICIPATED THROUGH
REMINISCING AND COLORING. PT PLEASNAT AND ON TASK WITH NO PARANOID DELUSIONS
OR Pentecostalism PREOCCUPATION EXPRESSED. PT WILL CONTINUE TO BE ENOCURAGED TO
ATTEND/ PARTICIPATE IN FUTURE GROUP SESSIONS.

## 2020-03-10 NOTE — NUR
TREATMENT PLAN MEETING WAS HELD WITH DR. ELMORE, MISSY RUBIN, RN, AT, LISW-S AND
DISCHARGE PLANNER. PLAN FOR DISCHARGE FRIDAY. PT. ACCEPTED AT [facility]
AND [facility] PENDING ST[facility]. WILL FOLLOW.

## 2020-03-10 NOTE — NUR
Faxed emergency guardianship document to Kathryn Daniel at [facility]
Adult Protective Services.

## 2020-03-11 VITALS — DIASTOLIC BLOOD PRESSURE: 69 MMHG | SYSTOLIC BLOOD PRESSURE: 114 MMHG

## 2020-03-11 VITALS — DIASTOLIC BLOOD PRESSURE: 70 MMHG

## 2020-03-11 NOTE — NUR
P-CONFUSION, ISOLATIVE
 
I-REDIRECTION WITH THERAPEUTIC INTERVENTIONS AND PRESENT REALITY. EDUCATE AND
ENCOURAGE MEDICATION COMPLIANCE
 
R-PATIENT MEDICATION COMPLIANT AT HS. PATIENT ISOLATIVE IN DINING AREA AT
TIMES AND WITH LIMITED INTERACTION WITH PEERS. PATIENT PROVIDED FLUIDS AND
NOURISHMENT AT HS.  PATIENT WITH NO SUICIDAL OR HOMICIDAL IDEATIONS. PATIENT
WITH NO HALLUCINATIONS. PATIENT REQUESTED TYLENOL AT HS FOR COMPLAINT OF PAIN
"ALL OVER". TYLENOL 650MG EFFECTIVE AT THIS TIME.
 
P-CONTINUE TO ENCOURAGE MEDICATION COMPLIANCE, CONTINUE TO ENCOURAGE REALITY,
ENCOURAGE GROUP THEAPY WHILE AWAKE

## 2020-03-11 NOTE — NUR
PATIENT SLEPT 7 HOURS OF UNINTERRUPTED SLEEP THROUGHOUT SHIFT. Q 15 MINUTE
CHECKS MAINTAINED. 24 HR chart check completed.

## 2020-03-11 NOTE — NUR
GIULIA SONG FROM ADULT PROTECTIVE SERVICES VISITS. SERVED PATIENT WITH
EMERGENCY GUARDIAN PAPERWORK. COPY LEFT FOR PATIENT AND COPY PLACED IN PATIENT
CHART. [facility].

## 2020-03-11 NOTE — NUR
TREATMENT PLAN MEETING WAS HELD WITH DR. ELMORE, MISSY RUBIN, RN, AT, LISW-S AND
DISCHARGE PLANNER. PLAN FOR DISCHARGE WHEN STABLE. PT. IS ACCEPTED AT [facility]. EMERGENCY GUARDIANSHIP IS PENDING.

## 2020-03-11 NOTE — NUR
Spoke with Kathryn Daniel of [facility] who
stated that she will be at [facility] today to serve guardianship papers to pt.
Spoke to pt's son Doug and confirmed that he wants pt to discharge to
[facility]. Discussed payer source for NF should it become long-term.

## 2020-03-11 NOTE — NUR
P: PT ISOLATIVE TO ROOM THROUGHOUT THE DAY, REFUSING TO PARTICIPATE IN GROUPS
AND REFUSING MEALS. PT IRRITABLE WITH STAFF AT TIMES. PT DELUSIONAL TELLING
STAFF THAT SHE SPOKE TO DR ELMORE DURING THE NIGHT AND HE TOLD HER SHE COULD
LEAVE ANYTIME BECAUSE SHE IS DUE IN COURT TOMORROW
 
I: PROVIDE EMOTIONAL SUPPORT AND 1:1 FOR PT TO VOICE FEELINGS, ENCOURAGE MED
COMPLAINCE, ENCOURAGE GROUP PARTICIPATION AND SOCIALIZATION, ENCOURAGE PO
INTAKE, RE-ORIENT AND PRESENT REALITY WITH EACH INTERACTION AND AS NEEDED
 
R: PT ALERT TO PERSON, PLACE AND TIME. PT MED COMPLIANT WITHOUT DIFFICULTY,
MED EDUCATION PROVIDED. PT CALM, CONTINUES TO ISOLATE TO HER ROOM, REFUSING
MEALS AND GROUP. PT CONTINUES TO BE DELUSIONAL STATING THAT SHE SPOKE WITH DR ELMORE AND HE TOLD HER SHE CAN LEAVE AT ANYTIME AND SHE DOESNT HAVE TO LISTEN
TO STAFF. PT AMBULATORY THROUGHOUT UNIT, GAIT STEADY. PT CONTINENT OF BOWEL
AND BLADDER.
 
P: MONITOR PT BEHAVIORS ON Q15 MIN SAFETY CHECKS, ENCOURAGE MED COMPLIANCE AND
PROVIDE MED EDUCATION, ENCOURAGE PO INTAKE, ENCOURAGE GROUP PARTICIPATION AND
SOCIALIZATION, PROVIDE EMOTIONAL SUPPORT AND 1:1 FOR PT TO VOICE FEELINGS,
RE-ORIENT AND PRESENT REALITY WITH EACH INTERACTION AND AS NEEDED.

## 2020-03-11 NOTE — NUR
SPOKE WITH SRIRAM ADMISSIONS COORDINATOR AT [facility]. PT. IS
OK TO ADMIT ON FRIDAY TO SECURE UNIT. MEDICATION INVEGA WILL HAVE TO BE P.O.
AT DISCHARGE. ADVISED THAT PT. SON WILL BE APPOINTED GUARDIAN AND SRIRAM STATES
THAT HE CAN SIGN FOR PT. TO BE ADMITTED TO SECURE UNIT.

## 2020-03-12 VITALS — SYSTOLIC BLOOD PRESSURE: 114 MMHG | DIASTOLIC BLOOD PRESSURE: 64 MMHG

## 2020-03-12 VITALS — DIASTOLIC BLOOD PRESSURE: 62 MMHG

## 2020-03-12 NOTE — NUR
Late Entry: Yesterday at approximately 13:00 Kathryn Daniel of [facility] served pt wih guardianship papers. Pt
accepted the explanation provided by Kathryn. Pt did not exhibit any
inappropriate behaviors nor speak of any delusions. Pt accepted the documents
and stated that she would look them over.

## 2020-03-12 NOTE — NUR
P-ISOLATIVE
I-PROVIDED 1:1 WITH THERAPEUTIC INTERVENTIONS. ENCOURAGED MEDICATION
COMPLIANCE AND EDUCATED. MONITOR SLEEP
R-PATIENT ALERT AND ORIENTED X4, IN ROOM AND BED SINCE BEGINNING OF SHIFT. PT
PLEASANT UPON APPROACH STATING SHE IS DOING GOOD. NO BIZARRE BEHAVIORS
OBSERVED. NO NOTED RESPONDING TO INTERNAL STIMULI. MEDICATION COMPLIANT
WITHOUT DIFFICULTY AFTER REVIEW. RECEIVED PRN TYLENOL 650MG PO AS REQUESTED AT
2122 FOR C/O A HEADACHE WITH A RATING OF 5/10. NO FURTHER COMPLAINTS NOTED
SINCE ADMINISTRATION, PRN EFFECTIVE.
P-CONTINUE TO MONITOR MOOD AND BEHAVIORS. MAINTAIN Q 15 MIN CHECKS AND PRN FOR
SAFETY.

## 2020-03-12 NOTE — NUR
P: PT IRRITABLE WITH STAFF AT TIMES. PT MOOD IS DEPRESSED.  PT ISOALTIVE TO
ROOM, REFUSING TO PARTICIPATE IN MORNING GROUP/ACTIVITIES. PT REFUSED LUNCH.
 
I: PROVIDE EMOTIONAL SUPPORT AND 1:1 FOR PT TO VOICE FEELINGS, ENCOURAGE MED
COMPLIANCE AND PROVIDE MED EDUCATION,ENCOURAGE GROUP PARTICIPATION AND
SOCIALIZATION, ENCOURAGE PO INTAKE
 
R: PT ALERT TO PERSON, PLACE AND TIME. PT MED COMPLIANT WITHOUT DIFFICULTY,
MED EDUCATION PROVIDED. PT CALM, MOOD REMAINS DEPRESSED. PT CONTINUES  TO
REFUSE LUNCH AND PARTICIPATION IN GROUPS/ACTIVITIES. NO HALLUCINATIONS OR
DELUSIONS NOTED. PT DENIES ANY SUICIDAL THOUGHTS. PT AMBULATORY THROUGHOUT
UNIT, GAIT STEADY. PT CONTINENT OF BOWEL AND BLADDER, EPISODES OF INCONTINENCE
NOTED, CARE PROVIDED AS NEEDED.
 
P: MONITOR PT BEHAVIORS ON Q15 MIN SAFETY CHECKS, ENCOURAGE MED COMPLIANCE AND
PROVIDE MED EDUCATION, ENCOURAGE PO INTAKE, PROVIDE EMOTIONAL SUPPORT AND 1:1
FOR PT TO VOICE FEELINGS, ENCOURAGE GROUP PARTICIPATION AND SOCIALIZATION

## 2020-03-12 NOTE — NUR
TREATMENT PLAN MEETING WAS HELD WITH DR. ELMORE, RN, AT, LISW-S AND DISCHARGE
PLANNER. PLAN FOR DISCHARGE FRIDAY. PT. WILL DISCHARGE TO [facility] AND
[facility].

## 2020-03-12 NOTE — NUR
P-ISOLATIVE
 
I-PROVIDE 1:1, ADMINISTER MEDS, MONITOR SLEEP
 
R-PT IS PLEASANT DURING VERBAL INTERVENTION. ALERT TO PERSON, PLACE & TIME.
NO BIZAARE BEHAVIORS NOTED. DENIES SENSORY DISTURBANCE & NONE IS EVIDENT. PT
KEEPS TO HERSELF & STAYS IN HER ROOM. DID COME TO THE DINING ROOM & ATE SNACK
TOOK MEDS WHOLE.
 
P-CONTINUE TO MONITOR & PROVIDE ASSISTANCE & EMOTIONAL SUPPORT WHEN NEEDED.

## 2020-03-12 NOTE — NUR
Spoke with pt's son Doug and informed him that pt had been served the
guardianship papers yesterday. Discussed pt's reaction to this. Confirmed with
Doug that pt is to discharge tomorrow to [facility].

## 2020-03-13 VITALS — DIASTOLIC BLOOD PRESSURE: 58 MMHG

## 2020-03-13 NOTE — NUR
TREATMENT PLAN MEETING WAS HELD WITH MISSY RUBIN, RN, AT, LISW-S AND DISCHARGE
PLANNER. PLAN FOR DISCHARGE TODAY WITH PT. GOING TO [facility] AND
[facility].

## 2020-03-13 NOTE — NUR
NURSE TO NURSE REPORT GIVEN TO "YUE" NURSE ON 3 EAST AT [place].  DISCHARGE
PAPERWORK FAXED PER HER REQUEST. STATES THAT SHE WILL CALL IF SHE HAS ANY
QUESTIONS.

## 2020-03-13 NOTE — NUR
Patient is discharging today to [facility] and [facility]. Follow-up will be
with Dr Fuentes, visiting psychiatrist. While at [facility], pt's paranoid delusions
lessened. Pt reports that her mood has improved. It was determined while at
[facility] that pt was incompetent to make medical decisions and an emergency
guardianship was sought for pt. By discharge, pt's son Doug Qureshi was
appointed pt's guardian. Pt had a tendency to isolate throughout her stay and
participated in very little of the programming. Kathryn Daniel of [facility] was notified by this writer of pt's
discharge.

## 2020-03-13 NOTE — NUR
AM GROUP/WATERCOLORS
PT DID NOT ATTEND MORNING GROUP THERAPY. PT ISOLATES TO HER ROOM. PT IS SET TO
BE DISCHARGED FROM THE UNIT THIS AFTERNOON

## 2020-03-13 NOTE — NUR
Patient was pleasant this AM with this writer but did not readily engage in
conversation. Pt would only answer with short responses when directly asked a
question. Pt did not voice any delusional content during interaction.

## 2020-03-13 NOTE — NUR
SPOKE WITH GERARD IN ADMISSIONS AT [facility] AND [facility]. ADVISED OF
PLANS TO DISCHARGE TODAY WITH  TIME 12:00 P.M. VIA [facility].

## 2020-03-13 NOTE — NUR
PT C/O HAVING ANXIETY "I THINK ITS BECAUSE I'M GOING TO THAT NEW FACILITY
TOMORROW". MEDICATED WITH ATIVAN 1 MG PO @ 0004

## 2022-10-24 NOTE — NUR
AM GROUP/LEISURE
PT ENCOURAGED TO ATTEND/PARTICIPATE IN GROUP BUT CHOOSES TO ISOLATE IN BED AT
THIS TIME. PT WILL CONTINUE TO BE ENCOURAGED TO ATTEND/PARTICIPATE IN FUTRUE
GROUP SESSIONS. Cyclophosphamide Pregnancy And Lactation Text: This medication is Pregnancy Category D and it isn't considered safe during pregnancy. This medication is excreted in breast milk.

## 2023-10-19 NOTE — NUR
PT SLEPT 4 HOURS TOTAL THIS SHIFT. PT AWOKE ONE TIME, CAME OUT TO DRAW, STATED
SHE COULD NOT FOCUS AND THEN WENT BACK TO BED. [facility]:  I discussed with the patient the risks of golimumab including but not limited to myelosuppression, immunosuppression, autoimmune hepatitis, demyelinating diseases, lymphoma, and serious infections.  The patient understands that monitoring is required including a PPD at baseline and must alert us or the primary physician if symptoms of infection or other concerning signs are noted.

## 2024-11-21 NOTE — NUR
P-[employer], BIZARRE BEHAVIOR
 
I-PROVIDE 1:1, ASSESS ORIENTATION, REDIRECT, ADMINISTER MEDS, MONITOR SLEEP,
ASSESS EFFECTIVENESS OF MOM
 
R-PT SEEMS EUPHORIC DURING VERBAL INTERVENTION. ALERT TO PERSON, PLACE & TIME.
BIZAARE BEHAVIOR. PT WANDERING IN THE HALLWAY WITH BLANKET OVER HER HEAD &
BODY TALKING TO UNSEEN OTHERS. RECEPTIVE TO REDIRECTION. WHEN SHE REMOVED
BLANKET, SHE TURNED HER HEAD ABRUPTLY & CONTINUED TO TALK TO UNSEEN OTHERS.
PT HAS BEEN SITTING IN HER BATHROOM, ON A CHAIR FACING THE WALL, DOOR CLOSED &
LIGHT TURNED OUT & STATED THAT SHE IS PRAYING TO GOD. REFUSED SNACK. HAS KEPT
TO HERSELF. TOOK MEDS WHOLE. STATED NO RELIEF WITH MOM.
 
P-CONTINUE TO MONITOR & PROVIDE ASSISTANCE & EMOTIONAL SUPPORT WHEN NEEDED. Patient says she received the medication. No additional help required per note.